# Patient Record
Sex: FEMALE | Race: WHITE | Employment: FULL TIME | URBAN - METROPOLITAN AREA
[De-identification: names, ages, dates, MRNs, and addresses within clinical notes are randomized per-mention and may not be internally consistent; named-entity substitution may affect disease eponyms.]

---

## 2019-05-03 PROBLEM — R31.0 GROSS HEMATURIA: Status: ACTIVE | Noted: 2019-05-03

## 2020-08-13 ENCOUNTER — HOSPITAL ENCOUNTER (OUTPATIENT)
Dept: CT IMAGING | Age: 50
Discharge: HOME | End: 2020-08-13
Attending: OBSTETRICS & GYNECOLOGY
Payer: SELF-PAY

## 2020-08-13 DIAGNOSIS — Z13.6 ENCOUNTER FOR SPECIAL SCREENING EXAMINATION FOR CARDIOVASCULAR DISORDER: ICD-10-CM

## 2020-08-13 PROCEDURE — 75571 CT HRT W/O DYE W/CA TEST: CPT

## 2020-08-19 ENCOUNTER — TELEPHONE (OUTPATIENT)
Dept: OTHER | Age: 50
End: 2020-08-19

## 2020-08-27 ENCOUNTER — TELEPHONE (OUTPATIENT)
Dept: OTHER | Age: 50
End: 2020-08-27

## 2020-08-27 NOTE — TELEPHONE ENCOUNTER
Patient identity verified and matched to demographic data. Patient notified of Coronary Calcium Score of 0. Patient education provided to maintain healthy lifestyle, healthy diet, and regular exercise. Follow up recommended with Primary Care MD prn. Patient verbalized understanding of results, patient education, and follow up care.

## 2021-02-26 ENCOUNTER — OFFICE VISIT (OUTPATIENT)
Dept: ORTHOPEDIC SURGERY | Age: 51
End: 2021-02-26
Payer: COMMERCIAL

## 2021-02-26 VITALS
BODY MASS INDEX: 26.99 KG/M2 | HEIGHT: 65 IN | RESPIRATION RATE: 18 BRPM | SYSTOLIC BLOOD PRESSURE: 103 MMHG | HEART RATE: 75 BPM | OXYGEN SATURATION: 99 % | TEMPERATURE: 97.1 F | DIASTOLIC BLOOD PRESSURE: 60 MMHG | WEIGHT: 162 LBS

## 2021-02-26 DIAGNOSIS — M67.442 DIGITAL MUCOUS CYST OF LEFT HAND: Primary | ICD-10-CM

## 2021-02-26 DIAGNOSIS — M15.1 DEGENERATIVE ARTHRITIS OF DISTAL INTERPHALANGEAL JOINT OF MIDDLE FINGER OF LEFT HAND: ICD-10-CM

## 2021-02-26 DIAGNOSIS — R22.32 FINGER MASS, LEFT: ICD-10-CM

## 2021-02-26 PROCEDURE — 73140 X-RAY EXAM OF FINGER(S): CPT | Performed by: ORTHOPAEDIC SURGERY

## 2021-02-26 PROCEDURE — 99204 OFFICE O/P NEW MOD 45 MIN: CPT | Performed by: ORTHOPAEDIC SURGERY

## 2021-02-26 NOTE — PROGRESS NOTES
Hector Elizondo is a 45 y.o. female right handed unspecified employment. Worker's Compensation and legal considerations: none filed. Vitals:    02/26/21 0855   BP: 103/60   Pulse: 75   Resp: 18   Temp: 97.1 °F (36.2 °C)   SpO2: 99%   Weight: 162 lb (73.5 kg)   Height: 5' 5\" (1.651 m)   PainSc:   1           Chief Complaint   Patient presents with    Finger Pain     left MF         HPI: Patient presents today with complaints of a mass over the back of her left middle finger near the tip. She reports pain about the mass whenever she bumps it. Date of onset: 2020    Injury: No    Prior Treatment:  No    Numbness/ Tingling: No      ROS: Review of Systems - General ROS: negative  Psychological ROS: negative  ENT ROS: negative  Allergy and Immunology ROS: negative  Hematological and Lymphatic ROS: negative  Respiratory ROS: no cough, shortness of breath, or wheezing  Cardiovascular ROS: no chest pain or dyspnea on exertion  Gastrointestinal ROS: no abdominal pain, change in bowel habits, or black or bloody stools  Musculoskeletal ROS: positive for - pain in finger - left  Neurological ROS: negative  Dermatological ROS: negative    Past Medical History:   Diagnosis Date    Calculus of kidney     Calculus of ureter     [facility] [address]     Hypercalciuria     Hyperuricosuria     Left flank pain     Meningioma ([facility] [address] 2016    s/p resection 8/2/2016 dr Lisset Perez Urinary frequency        Past Surgical History:   Procedure Laterality Date    HX MOHS PROCEDURES  10/7/15       Current Outpatient Medications   Medication Sig Dispense Refill    multivitamin (THERAPEUTIC LIQUID) liquid Take 10 mL by mouth.  tamsulosin (FLOMAX) 0.4 mg capsule Take 1 Cap by mouth daily (after dinner). Indications: kidney stone 20 Cap 0    lancets (ONETOUCH DELICA LANCETS) 30 gauge misc 1 Device.  glucose blood VI test strips (ONETOUCH VERIO) strip 50 Each.  tangerine flavor, bulk, powd by Does Not Apply route.       iodine 150 mcg tab Take  by mouth.  selenium (SELEPEN) 50 mcg tab Take 100 mcg by mouth daily.  tamsulosin (FLOMAX) 0.4 mg capsule Take 1 Cap by mouth daily. 30 Cap 11       Allergies   Allergen Reactions    [place] Anaphylaxis    Prednisone Other (comments)     Altered mental status           PE:     Physical Exam  Vitals signs and nursing note reviewed. Constitutional:       General: She is not in acute distress. Appearance: Normal appearance. She is not ill-appearing, toxic-appearing or diaphoretic. HENT:      Head: Normocephalic and atraumatic. Nose: Nose normal.      Mouth/Throat:      Mouth: Mucous membranes are moist.   Eyes:      Extraocular Movements: Extraocular movements intact. Pupils: Pupils are equal, round, and reactive to light. Neck:      Musculoskeletal: Normal range of motion and neck supple. Cardiovascular:      Pulses: Normal pulses. Pulmonary:      Effort: Pulmonary effort is normal. No respiratory distress. Abdominal:      General: Abdomen is flat. There is no distension. Musculoskeletal: Normal range of motion. General: Swelling and tenderness present. No deformity or signs of injury. Right lower leg: No edema. Left lower leg: No edema. Skin:     General: Skin is warm and dry. Capillary Refill: Capillary refill takes less than 2 seconds. Findings: No bruising or erythema. Neurological:      General: No focal deficit present. Mental Status: She is alert and oriented to person, place, and time. Cranial Nerves: No cranial nerve deficit. Sensory: No sensory deficit. Psychiatric:         Mood and Affect: Mood normal.         Behavior: Behavior normal.            Left hand: There is a palpable mass dorsal to the DIP joint of the middle finger. This is consistent with a digital mucous cyst.  This is mildly tender to palpation. Range of motion is full. Sensation is intact distally.       Imagin2021 2 views of left middle finger does not show any fracture, dislocation, degenerative changes, or any other osseous abnormalities. ICD-10-CM ICD-9-CM    1. Digital mucous cyst of left hand  M67.442 727.43 SCHEDULE SURGERY   2. 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 Finger mass, left  R22.32 782.2 AMB POC XRAY, FINGER(S), 2+ VIEWS         Plan:     Discussed operative and nonoperative treatment patient would like to have the cyst removed. Schedule left middle finger digital mucous cyst excision and possible rotational flap.    45 minutes was spent discussing operative, nonoperative treatment past medical history, and postoperative convalescence as well as consenting the patient. This procedure has been fully reviewed with the patient and written informed consent has been obtained. The patient was counseled at length about the risks of aracelis Covid-19 during their perioperative period and any recovery window from their procedure. The patient was made aware that aracelis Covid-19  may worsen their prognosis for recovering from their procedure and lend to a higher morbidity and/or mortality risk. All material risks, benefits, and reasonable alternatives including postponing the procedure were discussed. The patient does  wish to proceed with the procedure at this time. Follow-up and Dispositions    · Return for 2 weeks postop.           Plan was reviewed with patient, who verbalized agreement and understanding of the plan

## 2021-03-01 DIAGNOSIS — Z01.818 PREOP EXAMINATION: Primary | ICD-10-CM

## 2021-03-16 ENCOUNTER — OFFICE VISIT (OUTPATIENT)
Dept: ORTHOPEDIC SURGERY | Age: 51
End: 2021-03-16
Payer: COMMERCIAL

## 2021-03-16 VITALS
OXYGEN SATURATION: 98 % | TEMPERATURE: 97.3 F | RESPIRATION RATE: 18 BRPM | HEART RATE: 80 BPM | BODY MASS INDEX: 26.82 KG/M2 | WEIGHT: 161 LBS | DIASTOLIC BLOOD PRESSURE: 53 MMHG | SYSTOLIC BLOOD PRESSURE: 101 MMHG | HEIGHT: 65 IN

## 2021-03-16 DIAGNOSIS — M25.512 ACUTE PAIN OF LEFT SHOULDER: ICD-10-CM

## 2021-03-16 DIAGNOSIS — M75.02 ADHESIVE CAPSULITIS OF LEFT SHOULDER: Primary | ICD-10-CM

## 2021-03-16 PROCEDURE — 99203 OFFICE O/P NEW LOW 30 MIN: CPT | Performed by: ORTHOPAEDIC SURGERY

## 2021-03-16 PROCEDURE — 73030 X-RAY EXAM OF SHOULDER: CPT | Performed by: ORTHOPAEDIC SURGERY

## 2021-03-16 RX ORDER — MELOXICAM 15 MG/1
15 TABLET ORAL
Qty: 30 TAB | Refills: 1 | Status: CANCELLED | OUTPATIENT
Start: 2021-03-16

## 2021-03-16 RX ORDER — CELECOXIB 200 MG/1
200 CAPSULE ORAL 2 TIMES DAILY
Qty: 60 CAP | Refills: 0 | Status: SHIPPED | OUTPATIENT
Start: 2021-03-16

## 2021-03-16 NOTE — PROGRESS NOTES
Paluie Herrera  1970   Chief Complaint   Patient presents with    Shoulder Pain     left shoulder pain        HISTORY OF PRESENT ILLNESS  Paulie Herrera is a 45 y.o. female who presents today for evaluation of left shoulder pain. She rates her pain 7/10 today. Pain has been present for 1.5 months. No specific injury. Has hx of right frozen shoulder. Tried PT for 6 months and had two shoulder manipulations of her right shoulder. Describes a burning pain in the left shoulder. Has some limited ROM. Patient describes the pain as burning, aching and sharp that is Intermittent in nature. Symptoms are worse with moving arm behind back, Activity and is better with  Heat. Associated symptoms include nothing. Since problem started, it: is unchanged. Pain does not wake patient up at night. Has taken no meds for the problem. Has tried following treatments: Injections:NO; Brace:NO;  Therapy:NO; Cane/Crutch:NO       Allergies   Allergen Reactions    [place] Anaphylaxis    Prednisone Other (comments)     Altered mental status        Past Medical History:   Diagnosis Date    Calculus of kidney     Calculus of ureter     Diabetes ([facility] [address]     Hypercalciuria     Hyperuricosuria     Left flank pain     Meningioma ([facility] [address] 2016    s/p resection 8/2/2016 dr Jeromy Phelps   [facility] Urinary frequency       Social History     Socioeconomic History    Marital status:      Spouse name: Not on file    Number of children: Not on file    Years of education: Not on file    Highest education level: Not on file   Occupational History    Not on file   Social Needs    Financial resource strain: Not on file    Food insecurity     Worry: Not on file     Inability: Not on file   [employer] needs     Medical: Not on file     Non-medical: Not on file   Tobacco Use    Smoking status: Never Smoker    Smokeless tobacco: Never Used   Substance and Sexual Activity    Alcohol use: No    Drug use: No    Sexual activity: Yes Partners: Male     Birth control/protection: None   Lifestyle    Physical activity     Days per week: Not on file     Minutes per session: Not on file    Stress: Not on file   Relationships    Social connections     Talks on phone: Not on file     Gets together: Not on file     Attends Jehovah's witness service: Not on file     Active member of club or organization: Not on file     Attends meetings of clubs or organizations: Not on file     Relationship status: Not on file    Intimate partner violence     Fear of current or ex partner: Not on file     Emotionally abused: Not on file     Physically abused: Not on file     Forced sexual activity: Not on file   Other Topics Concern    Not on file   Social History Narrative    Not on file      Past Surgical History:   Procedure Laterality Date    HX MOHS PROCEDURES  10/7/15      Family History   Problem Relation Age of Onset    MS Mother     High Cholesterol Father     Elevated Lipids Father     Diabetes Maternal Grandmother     Cancer Maternal Grandmother     Drug Abuse Son     Depression Son     Substance Abuse Son       Current Outpatient Medications   Medication Sig    multivitamin (THERAPEUTIC LIQUID) liquid Take 10 mL by mouth.  lancets (ONETOUCH DELICA LANCETS) 30 gauge misc 1 Device.  glucose blood VI test strips (ONETOUCH VERIO) strip 50 Each.  tamsulosin (FLOMAX) 0.4 mg capsule Take 1 Cap by mouth daily (after dinner). Indications: kidney stone    tangerine flavor, bulk, powd by Does Not Apply route.  iodine 150 mcg tab Take  by mouth.  selenium (SELEPEN) 50 mcg tab Take 100 mcg by mouth daily.  tamsulosin (FLOMAX) 0.4 mg capsule Take 1 Cap by mouth daily. No current facility-administered medications for this visit. REVIEW OF SYSTEM   Patient denies: Weight loss, Fever/Chills, HA, Visual changes, Fatigue, Chest pain, SOB, Abdominal pain, N/V/D/C, Blood in stool or urine, Edema. Pertinent positive as above in HPI.  All others were negative    PHYSICAL EXAM:   Visit Vitals  BP (!) 101/53 (BP 1 Location: Right upper arm, BP Patient Position: Sitting, BP Cuff Size: Large adult)   Pulse 80   Temp 97.3 °F (36.3 °C) (Temporal)   Resp 18   Ht 5' 5\" (1.651 m)   Wt 161 lb (73 kg)   SpO2 98%   BMI 26.79 kg/m²     The patient is a well-developed, well-nourished female   in no acute distress. The patient is alert and oriented times three. The patient is alert and oriented times three. Mood and affect are normal.  LYMPHATIC: lymph nodes are not enlarged and are within normal limits  SKIN: normal in color and non tender to palpation. There are no bruises or abrasions noted. NEUROLOGICAL: Motor sensory exam is within normal limits. Reflexes are equal bilaterally. There is normal sensation to pinprick and light touch  MUSCULOSKELETAL:  Examination Left shoulder   Skin Intact   AC joint tenderness -   Biceps tenderness -   Forward flexion/Elevation    Active abduction    Glenohumeral abduction 60   External rotation ROM 30   Internal rotation ROM 30   Apprehension -   Tacos Relocation -   Jerk -   Load and Shift -   Obriens -   Speeds -   Impingement sign +   Supraspinatus/Empty Can -, 5/5   External Rotation Strength -, 5/5   Lift Off/Belly Press -, 5/5   Neurovascular Intact       IMAGING: XR of left shoulder with 3 views obtained in the office dated 3/16/2021 was reviewed and read by Dr. Gutierrez Shall: No acute abnormalities       IMPRESSION:      ICD-10-CM ICD-9-CM    1. Adhesive capsulitis of left shoulder  M75.02 726.0 REFERRAL TO PHYSICAL THERAPY      celecoxib (CeleBREX) 200 mg capsule   2. Acute pain of left shoulder  M25.512 719.41 AMB POC XRAY, SHOULDER; COMPLETE, 2+        PLAN:  1. Pt presents today with left shoulder pain due to adhesive capsulitis and I would like for her to begin PT. Was also given prescription for Celebrex today. Risk factors include: dm  2. No ultrasound exam indicated today  3.  No cortisone injection indicated today   4. Yes Physical/Occupational Therapy indicated today  5. No diagnostic test indicated today:   6. No durable medical equipment indicated today  7. No referral indicated today   8. Yes medications indicated today: CELEBREX  9. No Narcotic indicated today       RTC 4 weeks      Scribed by Donna Mckeon) as dictated by Jennifer Hui MD    I, Dr. Jennifer Hui, confirm that all documentation is accurate.     Jennifer Hui M.D.   [facility]

## 2021-03-18 ENCOUNTER — HOSPITAL ENCOUNTER (OUTPATIENT)
Dept: PHYSICAL THERAPY | Age: 51
Discharge: HOME | End: 2021-03-18
Payer: COMMERCIAL

## 2021-03-18 PROCEDURE — 97161 PT EVAL LOW COMPLEX 20 MIN: CPT

## 2021-03-18 PROCEDURE — 97110 THERAPEUTIC EXERCISES: CPT

## 2021-03-18 NOTE — PROGRESS NOTES
[facility]  [address]  [place], [address]  [phone] fax    Plan of Care/ Statement of Necessity for Physical Therapy Services    Patient name: Tg Toro Start of Care: 3/18/2021   Referral source: Rex Childers,* : 1970    Medical Diagnosis: Left shoulder pain [M25.512]  Payor: PayLease / Plan: [place] [employer] / Product Type: PPO /  Onset Date:2021    Treatment Diagnosis: left shoulder pain   Prior Hospitalization: see medical history Provider#: [id number]   Medications: Verified on Patient summary List    Comorbidities: diabetes, right frozen shoulder, brain surgery   Prior Level of Function: functionally I with all activities     The Plan of Care and following information is based on the information from the initial evaluation. Assessment/ key information: 46 y/o female presents with c/o left shoulder pain that started last month with no mechanism of injury. She has seen ortho and diagnosed with left adhesive capsulitis. The pt demonstrates limited left shoulder AROM flexion and abduction with pain. AROM IR and ER are WFL at this time and equal to the right. + pain is note with AROM IR. MMT reveals weakness with left shoulder flexion, abduction. PROM is limited with flexion and abduction and with pain. The pt currently is requesting limited PT sessions and wishes to perform HEP primarily at this time. She does also plan to seek chiropractic care and acupuncture. She will benefit from PT to address the aforementioned impairments.      Evaluation Complexity History LOW Complexity : Zero comorbidities / personal factors that will impact the outcome / POC; Examination MEDIUM Complexity : 3 Standardized tests and measures addressing body structure, function, activity limitation and / or participation in recreation  ;Presentation LOW Complexity : Stable, uncomplicated  ;Clinical Decision Making MEDIUM Complexity : FOTO score of 26-74  Overall Complexity Rating: LOW   Problem List: pain affecting function, decrease ROM, decrease strength, decrease ADL/ functional abilitiies, decrease activity tolerance and decrease flexibility/ joint mobility   Treatment Plan may include any combination of the following: Therapeutic exercise, Therapeutic activities, Neuromuscular re-education, Physical agent/modality, Manual therapy, Patient education and Self Care training  Patient / Family readiness to learn indicated by: asking questions, trying to perform skills and interest  Persons(s) to be included in education: patient (P)  Barriers to Learning/Limitations: None  Patient Goal (s): To learn exercises for home  Patient Self Reported Health Status: excellent  Rehabilitation Potential: good    Short Term Goals: To be accomplished in 1 weeks:   1. Pt pt will be I and compliant with [facility]   IE- issued HEP  Long Term Goals: To be accomplished in 3-4 weeks:   1. Improve FOTO to 74 for improved ability for daily tasks   IE- 61   2. Improve left shoulder AROM flexion to 155 degrees to improve ability for daily tasks   IE- 142 degrees   3. Improve left shoulder AROM ABD to 140 degrees for improved ability for ADL   IE- 128 degrees   4. The pt will report no difficulty with reaching a overhead shelf   IE- some difficulty  Frequency / Duration: Patient to be seen 1-2 times per week for 3-4 weeks. Patient/ Caregiver education and instruction: Diagnosis, prognosis, self care and exercises   [x]  Plan of care has been reviewed with RAMBO Shelby, PT 3/18/2021 10:06 AM    ________________________________________________________________________    I certify that the above Therapy Services are being furnished while the patient is under my care. I agree with the treatment plan and certify that this therapy is necessary.     Physician's Signature:____________Date:_________TIME:________     Halie Fraga,*  ** Signature, Date and Time must be completed for valid certification **    Please sign and return to [facility] [address]  [address]  [place], [address]  [phone] fax

## 2021-03-18 NOTE — PROGRESS NOTES
PT DAILY TREATMENT NOTE     Patient Name: Neal Hernandez  Date:3/18/2021  : 1970  [x]  Patient  Verified  Payor: Amadou Richmond / Plan:  [facility] / Product Type: PPO /    In OREE:[id number]  Out time:957  Total Treatment Time (min): 42  Visit #: 1 of 3-4    Medicare/BCBS Only   Total Timed Codes (min):  24 1:1 Treatment Time:  42       Treatment Area: Left shoulder pain [M25.512]    SUBJECTIVE  Pain Level (0-10 scale):  2  Any medication changes, allergies to medications, adverse drug reactions, diagnosis change, or new procedure performed?: [x] No    [] Yes (see summary sheet for update)  Subjective functional status/changes:   [] No changes reported       OBJECTIVE    Modality rationale:    Min Type Additional Details    [] Estim:  []Unatt       []IFC  []Premod                        []Other:  []w/ice   []w/heat  Position:  Location:    [] Estim: []Att    []TENS instruct  []NMES                    []Other:  []w/US   []w/ice   []w/heat  Position:  Location:    []  Traction: [] Cervical       []Lumbar                       [] Prone          []Supine                       []Intermittent   []Continuous Lbs:  [] before manual  [] after manual    []  Ultrasound: []Continuous   [] Pulsed                           []1MHz   []3MHz W/cm2:  Location:    []  Iontophoresis with dexamethasone         Location: [] Take home patch   [] In clinic    []  Ice     []  heat  []  Ice massage  []  Laser   []  Anodyne Position:  Location:    []  Laser with stim  []  Other:  Position:  Location:    []  Vasopneumatic Device Pressure:       [] lo [] med [] hi   Temperature: [] lo [] med [] hi   [] Skin assessment post-treatment:  []intact []redness- no adverse reaction    []redness  adverse reaction:     18 min [x]Eval                  []Re-Eval       24 min Therapeutic Exercise:  [] See flow sheet : HEP + PROM left shoulder    Rationale: increase ROM and increase strength to improve the patients ability to perform ADL            With   [] TE   [] TA   [] neuro   [] other: Patient Education: [x] Review HEP    [] Progressed/Changed HEP based on:   [] positioning   [] body mechanics   [] transfers   [] heat/ice application    [] other:      Other Objective/Functional Measures:       Pain Level (0-10 scale) post treatment: 2    ASSESSMENT/Changes in Function:      Patient will continue to benefit from skilled PT services to modify and progress therapeutic interventions, address functional mobility deficits, address ROM deficits, address strength deficits, analyze and address soft tissue restrictions and analyze and cue movement patterns to attain remaining goals. [x]  See Plan of Care  []  See progress note/recertification  []  See Discharge Summary         Progress towards goals / Updated goals:  Short Term Goals: To be accomplished in 1 weeks:               1. Pt pt will be I and compliant with HEP               IE- issued HEP  Long Term Goals:  To be accomplished in 3-4 weeks:               1. Improve FOTO to 74 for improved ability for daily tasks               IE- 61               2. Improve left shoulder AROM flexion to 155 degrees to improve ability for daily tasks               IE- 142 degrees               3. Improve left shoulder AROM ABD to 140 degrees for improved ability for ADL               IE- 128 degrees               4. The pt will report no difficulty with reaching a overhead shelf               IE- some difficulty    PLAN  []  Upgrade activities as tolerated     [x]  Continue plan of care  []  Update interventions per flow sheet       []  Discharge due to:_  []  Other:_      Rosaura Mills, PT 3/18/2021  10:02 AM    Future Appointments   Date Time Provider Alondra Mayo   3/24/2021  5:30 PM Chestine Bathe, PT [facility]   3/25/2021  8:05 AM Roque Chapman Sa, DO [facility]   4/8/2021  7:00 AM Chestine Bathe, PT [facility]   4/12/2021  8:00 AM Roque Chapman Sa, DO [facility]   4/22/2021  7:00 AM Angel Wang, PT [facility]

## 2021-03-24 ENCOUNTER — HOSPITAL ENCOUNTER (OUTPATIENT)
Dept: PHYSICAL THERAPY | Age: 51
Discharge: HOME | End: 2021-03-24
Payer: COMMERCIAL

## 2021-03-24 PROCEDURE — 97140 MANUAL THERAPY 1/> REGIONS: CPT

## 2021-03-24 PROCEDURE — 97016 VASOPNEUMATIC DEVICE THERAPY: CPT

## 2021-03-24 PROCEDURE — 97110 THERAPEUTIC EXERCISES: CPT

## 2021-03-24 NOTE — PROGRESS NOTES
PT DAILY TREATMENT NOTE     Patient Name: Mynor Close  Date:3/24/2021  : 1970  [x]  Patient  Verified  Payor: BLUE CROSS / Plan:  [facility] / Product Type: PPO /    In time:5:10  Out time:6:07  Total Treatment Time (min): 62  Visit #: 2 of 4    Medicare/BCBS Only   Total Timed Codes (min):  47 1:1 Treatment Time:  52       Treatment Area: Left shoulder pain [M25.512]    SUBJECTIVE  Pain Level (0-10 scale): 1-2  Any medication changes, allergies to medications, adverse drug reactions, diagnosis change, or new procedure performed?: [x] No    [] Yes (see summary sheet for update)  Subjective functional status/changes:   [] No changes reported  The pt reports HEP compliance and feels she is doing better.      OBJECTIVE    Modality rationale: decrease inflammation and decrease pain to improve the patients ability to perform all activiites   Min Type Additional Details    [] Estim:  []Unatt       []IFC  []Premod                        []Other:  []w/ice   []w/heat  Position:  Location:    [] Estim: []Att    []TENS instruct  []NMES                    []Other:  []w/US   []w/ice   []w/heat  Position:  Location:    []  Traction: [] Cervical       []Lumbar                       [] Prone          []Supine                       []Intermittent   []Continuous Lbs:  [] before manual  [] after manual    []  Ultrasound: []Continuous   [] Pulsed                           []1MHz   []3MHz W/cm2:  Location:    []  Iontophoresis with dexamethasone         Location: [] Take home patch   [] In clinic    []  Ice     []  heat  []  Ice massage  []  Laser   []  Anodyne Position:  Location:    []  Laser with stim  []  Other:  Position:  Location:   10 [x]  Vasopneumatic Device Pressure:       [x] lo [] med [] hi   Temperature: [x] lo [] med [] hi   [] Skin assessment post-treatment:  []intact []redness- no adverse reaction    []redness  adverse reaction:     37 min Therapeutic Exercise:  [x] See flow sheet : Rationale: increase ROM and increase strength to improve the patients ability to perform ADL         10 min Manual Therapy:  Pt right sidelying- STJ mobs, TPR supraspinatus and infraspinatus, pt supine-GHJ post and inferior grade II/III glides, PROM all planes    The manual therapy interventions were performed at a separate and distinct time from the therapeutic activities interventions. Rationale: decrease pain, increase ROM and increase tissue extensibility to improve functional mobility            With   [] TE   [] TA   [] neuro   [] other: Patient Education: [x] Review HEP    [] Progressed/Changed HEP based on:   [] positioning   [] body mechanics   [] transfers   [] heat/ice application    [] other:      Other Objective/Functional Measures: initiated treatment per flow sheet     Pain Level (0-10 scale) post treatment: 0    ASSESSMENT/Changes in Function: The pt performed all therex without much difficulty. She did have some end range pain with abduction. Patient will continue to benefit from skilled PT services to modify and progress therapeutic interventions, address functional mobility deficits, address ROM deficits, address strength deficits, analyze and address soft tissue restrictions and analyze and cue movement patterns to attain remaining goals. []  See Plan of Care  []  See progress note/recertification  []  See Discharge Summary         Progress towards goals / Updated goals:  Short Term Goals: To be accomplished in 1 weeks:               1. Pt pt will be I and compliant with HEP               IE- issued HEP   Current: reports compliance on 3-24-21  Long Term Goals: To be accomplished in 3-4 weeks:               1. Improve FOTO to 74 for improved ability for daily tasks               IE- 61               2.  Improve left shoulder AROM flexion to 155 degrees to improve ability for daily tasks               IE- 142 degrees               3. Improve left shoulder AROM ABD to 140 degrees for improved ability for ADL               IE- 128 degrees               4.  The pt will report no difficulty with reaching a overhead shelf               IE- some difficulty    PLAN  []  Upgrade activities as tolerated     [x]  Continue plan of care  []  Update interventions per flow sheet       []  Discharge due to:_  []  Other:_      Luc Nelson, PT 3/24/2021  5:23 PM    Future Appointments   Date Time Provider Alondra Mayo   3/24/2021  5:30 PM Moses Martinez, PT [facility]   3/25/2021  8:05 AM Siddharth SHULTZ, DO [facility]   3/26/2021  7:30 AM [facility] PAT ROOM P1 [facility]   4/8/2021  7:00 AM Moses Martinez, PT [facility]   4/12/2021  8:00 AM Siddharth SHULTZ DO [facility]   4/22/2021  7:00 AM Cale Spain, PT [facility]

## 2021-03-25 ENCOUNTER — OFFICE VISIT (OUTPATIENT)
Dept: ORTHOPEDIC SURGERY | Age: 51
End: 2021-03-25

## 2021-03-25 VITALS
DIASTOLIC BLOOD PRESSURE: 68 MMHG | TEMPERATURE: 97.1 F | SYSTOLIC BLOOD PRESSURE: 109 MMHG | HEIGHT: 65 IN | BODY MASS INDEX: 26.89 KG/M2 | WEIGHT: 161.4 LBS | HEART RATE: 80 BPM | RESPIRATION RATE: 16 BRPM | OXYGEN SATURATION: 98 %

## 2021-03-25 DIAGNOSIS — M67.442 DIGITAL MUCOUS CYST OF LEFT HAND: Primary | ICD-10-CM

## 2021-03-25 DIAGNOSIS — M15.1 DEGENERATIVE ARTHRITIS OF DISTAL INTERPHALANGEAL JOINT OF MIDDLE FINGER OF LEFT HAND: ICD-10-CM

## 2021-03-25 DIAGNOSIS — Z01.818 PREOP EXAMINATION: ICD-10-CM

## 2021-03-25 RX ORDER — IBUPROFEN 200 MG
400 TABLET ORAL
COMMUNITY
End: 2021-03-30

## 2021-03-25 NOTE — PROGRESS NOTES
Jefferson Puentes is a 45 y.o. female right handed unspecified employment. Worker's Compensation and legal considerations: none filed. Vitals:    03/25/21 0806   BP: 109/68   Pulse: 80   Resp: 16   Temp: 97.1 °F (36.2 °C)   SpO2: 98%   Weight: 161 lb 6.4 oz (73.2 kg)   Height: 5' 5\" (1.651 m)   PainSc:   0 - No pain           Chief Complaint   Patient presents with    Pre-op Exam     left middle finger         HPI: Patient presents today with complaints of a mass over the back of her left middle finger near the tip. She reports pain about the mass whenever she bumps it. Date of onset: 2020    Injury: No    Prior Treatment:  No    Numbness/ Tingling: No      ROS: Review of Systems - General ROS: negative  Psychological ROS: negative  ENT ROS: negative  Allergy and Immunology ROS: negative  Hematological and Lymphatic ROS: negative  Respiratory ROS: no cough, shortness of breath, or wheezing  Cardiovascular ROS: no chest pain or dyspnea on exertion  Gastrointestinal ROS: no abdominal pain, change in bowel habits, or black or bloody stools  Musculoskeletal ROS: positive for - pain in finger - left  Neurological ROS: negative  Dermatological ROS: negative    Past Medical History:   Diagnosis Date    Calculus of kidney     Calculus of ureter     Diabetes ([address]     Hypercalciuria     Hyperuricosuria     Left flank pain     M[facility]i[address] ([facility] [address] 2016    s/p resection 8/2/2016 dr Simone Tracy   [facility] Urinary frequency        Past Surgical History:   Procedure Laterality Date    HX MOHS PROCEDURES  10/7/15       Current Outpatient Medications   Medication Sig Dispense Refill    lancets (ONETOUCH DELICA LANCETS) 30 gauge misc 1 Device.  glucose blood VI test strips (ONETOUCH VERIO) strip 50 Each.  celecoxib (CeleBREX) 200 mg capsule Take 1 Cap by mouth two (2) times a day. 200 MG BID X 4 days, then once per day after 4 days. 60 Cap 0    tamsulosin (FLOMAX) 0.4 mg capsule Take 1 Cap by mouth daily (after dinner). Indications: kidney stone 20 Cap 0    multivitamin (THERAPEUTIC LIQUID) liquid Take 10 mL by mouth.  tangerine flavor, bulk, powd by Does Not Apply route.  iodine 150 mcg tab Take  by mouth.  selenium (SELEPEN) 50 mcg tab Take 100 mcg by mouth daily.  tamsulosin (FLOMAX) 0.4 mg capsule Take 1 Cap by mouth daily. 30 Cap 11       Allergies   Allergen Reactions    [place] Anaphylaxis    Prednisone Other (comments)     Altered mental status           PE:     Physical Exam  Vitals signs and nursing note reviewed. Constitutional:       General: She is not in acute distress. Appearance: Normal appearance. She is not ill-appearing, toxic-appearing or diaphoretic. HENT:      Head: Normocephalic and atraumatic. Nose: Nose normal.      Mouth/Throat:      Mouth: Mucous membranes are moist.   Eyes:      Extraocular Movements: Extraocular movements intact. Pupils: Pupils are equal, round, and reactive to light. Neck:      Musculoskeletal: Normal range of motion and neck supple. Cardiovascular:      Pulses: Normal pulses. Pulmonary:      Effort: Pulmonary effort is normal. No respiratory distress. Abdominal:      General: Abdomen is flat. There is no distension. Musculoskeletal: Normal range of motion. General: Swelling and tenderness present. No deformity or signs of injury. Right lower leg: No edema. Left lower leg: No edema. Skin:     General: Skin is warm and dry. Capillary Refill: Capillary refill takes less than 2 seconds. Findings: No bruising or erythema. Neurological:      General: No focal deficit present. Mental Status: She is alert and oriented to person, place, and time. Cranial Nerves: No cranial nerve deficit. Sensory: No sensory deficit. Psychiatric:         Mood and Affect: Mood normal.         Behavior: Behavior normal.            Left hand: There is a palpable mass dorsal to the DIP joint of the middle finger. This is consistent with a digital mucous cyst.  This is mildly tender to palpation. Range of motion is full. Sensation is intact distally. Imagin2021 2 views of left middle finger does not show any fracture, dislocation, degenerative changes, or any other osseous abnormalities. ICD-10-CM ICD-9-CM    1. Digital mucous cyst of left hand  M67.442 727.43    2. 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 This procedure has been fully reviewed with the patient and written informed consent has been obtained. The patient was counseled at length about the risks of aracelis Covid-19 during their perioperative period and any recovery window from their procedure. The patient was made aware that aracelis Covid-19  may worsen their prognosis for recovering from their procedure and lend to a higher morbidity and/or mortality risk. All material risks, benefits, and reasonable alternatives including postponing the procedure were discussed. The patient does  wish to proceed with the procedure at this time. Follow-up and Dispositions    · Return for as scheduled.           Plan was reviewed with patient, who verbalized agreement and understanding of the plan

## 2021-03-26 ENCOUNTER — HOSPITAL ENCOUNTER (OUTPATIENT)
Dept: PREADMISSION TESTING | Age: 51
Discharge: HOME | End: 2021-03-26
Payer: COMMERCIAL

## 2021-03-26 DIAGNOSIS — Z01.818 PREOP EXAMINATION: ICD-10-CM

## 2021-03-26 LAB
ALBUMIN SERPL-MCNC: 4.1 G/DL (ref 3.4–5)
ALBUMIN/GLOB SERPL: 1.1 {RATIO} (ref 0.8–1.7)
ALP SERPL-CCNC: 106 U/L (ref 45–117)
ALT SERPL-CCNC: 26 U/L (ref 13–56)
ANION GAP SERPL CALC-SCNC: 3 MMOL/L (ref 3–18)
AST SERPL-CCNC: 16 U/L (ref 10–38)
BASOPHILS # BLD: 0 K/UL (ref 0–0.1)
BASOPHILS NFR BLD: 0 % (ref 0–2)
BILIRUB SERPL-MCNC: 0.6 MG/DL (ref 0.2–1)
BUN SERPL-MCNC: 15 MG/DL (ref 7–18)
BUN/CREAT SERPL: 34 (ref 12–20)
CALCIUM SERPL-MCNC: 9.9 MG/DL (ref 8.5–10.1)
CHLORIDE SERPL-SCNC: 105 MMOL/L (ref 100–111)
CO2 SERPL-SCNC: 31 MMOL/L (ref 21–32)
CREAT SERPL-MCNC: 0.44 MG/DL (ref 0.6–1.3)
DIFFERENTIAL METHOD BLD: NORMAL
EOSINOPHIL # BLD: 0.2 K/UL (ref 0–0.4)
EOSINOPHIL NFR BLD: 2 % (ref 0–5)
ERYTHROCYTE [DISTWIDTH] IN BLOOD BY AUTOMATED COUNT: 13.7 % (ref 11.6–14.5)
GLOBULIN SER CALC-MCNC: 3.6 G/DL (ref 2–4)
GLUCOSE SERPL-MCNC: 118 MG/DL (ref 74–99)
HCT VFR BLD AUTO: 41.7 % (ref 35–45)
HGB BLD-MCNC: 13.9 G/DL (ref 12–16)
LYMPHOCYTES # BLD: 1.8 K/UL (ref 0.9–3.6)
LYMPHOCYTES NFR BLD: 29 % (ref 21–52)
MCH RBC QN AUTO: 29.3 PG (ref 24–34)
MCHC RBC AUTO-ENTMCNC: 33.3 G/DL (ref 31–37)
MCV RBC AUTO: 87.8 FL (ref 74–97)
MONOCYTES # BLD: 0.4 K/UL (ref 0.05–1.2)
MONOCYTES NFR BLD: 7 % (ref 3–10)
NEUTS SEG # BLD: 4 K/UL (ref 1.8–8)
NEUTS SEG NFR BLD: 62 % (ref 40–73)
PLATELET # BLD AUTO: 265 K/UL (ref 135–420)
PMV BLD AUTO: 10.6 FL (ref 9.2–11.8)
POTASSIUM SERPL-SCNC: 4.4 MMOL/L (ref 3.5–5.5)
PROT SERPL-MCNC: 7.7 G/DL (ref 6.4–8.2)
RBC # BLD AUTO: 4.75 M/UL (ref 4.2–5.3)
SODIUM SERPL-SCNC: 139 MMOL/L (ref 136–145)
WBC # BLD AUTO: 6.4 K/UL (ref 4.6–13.2)

## 2021-03-26 PROCEDURE — 85025 COMPLETE CBC W/AUTO DIFF WBC: CPT

## 2021-03-26 PROCEDURE — 36415 COLL VENOUS BLD VENIPUNCTURE: CPT

## 2021-03-26 PROCEDURE — U0003 INFECTIOUS AGENT DETECTION BY NUCLEIC ACID (DNA OR RNA); SEVERE ACUTE RESPIRATORY SYNDROME CORONAVIRUS 2 (SARS-COV-2) (CORONAVIRUS DISEASE [COVID-19]), AMPLIFIED PROBE TECHNIQUE, MAKING USE OF HIGH THROUGHPUT TECHNOLOGIES AS DESCRIBED BY CMS-2020-01-R: HCPCS

## 2021-03-26 PROCEDURE — 93005 ELECTROCARDIOGRAM TRACING: CPT

## 2021-03-26 PROCEDURE — 80053 COMPREHEN METABOLIC PANEL: CPT

## 2021-03-27 LAB
ATRIAL RATE: 66 BPM
CALCULATED P AXIS, ECG09: 61 DEGREES
CALCULATED R AXIS, ECG10: 44 DEGREES
CALCULATED T AXIS, ECG11: 65 DEGREES
DIAGNOSIS, 93000: NORMAL
P-R INTERVAL, ECG05: 152 MS
Q-T INTERVAL, ECG07: 386 MS
QRS DURATION, ECG06: 80 MS
QTC CALCULATION (BEZET), ECG08: 404 MS
SARS-COV-2, COV2NT: NOT DETECTED
VENTRICULAR RATE, ECG03: 66 BPM

## 2021-03-29 ENCOUNTER — ANESTHESIA EVENT (OUTPATIENT)
Dept: SURGERY | Age: 51
End: 2021-03-29
Payer: COMMERCIAL

## 2021-03-29 NOTE — H&P
Erik Malagon is a 45 y.o. female right handed unspecified employment. Worker's Compensation and legal considerations: none filed.         Vitals:     03/25/21 0806   BP: 109/68   Pulse: 80   Resp: 16   Temp: 97.1 °F (36.2 °C)   SpO2: 98%   Weight: 161 lb 6.4 oz (73.2 kg)   Height: 5' 5\" (1.651 m)   PainSc:   0 - No pain                    Chief Complaint   Patient presents with    Pre-op Exam       left middle finger            HPI: Patient presents today with complaints of a mass over the back of her left middle finger near the tip. She reports pain about the mass whenever she bumps it.     Date of onset: 2020     Injury: No     Prior Treatment:  No     Numbness/ Tingling: No        ROS: Review of Systems - General ROS: negative  Psychological ROS: negative  ENT ROS: negative  Allergy and Immunology ROS: negative  Hematological and Lymphatic ROS: negative  Respiratory ROS: no cough, shortness of breath, or wheezing  Cardiovascular ROS: no chest pain or dyspnea on exertion  Gastrointestinal ROS: no abdominal pain, change in bowel habits, or black or bloody stools  Musculoskeletal ROS: positive for - pain in finger - left  Neurological ROS: negative  Dermatological ROS: negative          Past Medical History:   Diagnosis Date    Calculus of kidney      Calculus of ureter      Diabetes (HCC)      Hypercalciuria      Hyperuricosuria      Left flank pain      Meningioma ([address] 2016     s/p resection 8/2/2016 dr Zuleika Higgins Urinary frequency           Surgical History         Past Surgical History:   Procedure Laterality Date    HX MOHS PROCEDURES   10/7/15                   Current Outpatient Medications   Medication Sig Dispense Refill    lancets (ONETOUCH DELICA LANCETS) 30 gauge misc 1 Device.        glucose blood VI test strips (ONETOUCH VERIO) strip 50 Each.        celecoxib (CeleBREX) 200 mg capsule Take 1 Cap by mouth two (2) times a day. 200 MG BID X 4 days, then once per day after 4 days.  61 Cap 0    tamsulosin (FLOMAX) 0.4 mg capsule Take 1 Cap by mouth daily (after dinner). Indications: kidney stone 20 Cap 0    multivitamin (THERAPEUTIC LIQUID) liquid Take 10 mL by mouth.        tangerine flavor, bulk, powd by Does Not Apply route.        iodine 150 mcg tab Take  by mouth.        selenium (SELEPEN) 50 mcg tab Take 100 mcg by mouth daily.        tamsulosin (FLOMAX) 0.4 mg capsule Take 1 Cap by mouth daily. 30 Cap 11               Allergies   Allergen Reactions    [place] Anaphylaxis    Prednisone Other (comments)       Altered mental status               PE:      Physical Exam  Vitals signs and nursing note reviewed. Constitutional:       General: She is not in acute distress. Appearance: Normal appearance. She is not ill-appearing, toxic-appearing or diaphoretic. HENT:      Head: Normocephalic and atraumatic. Nose: Nose normal.      Mouth/Throat:      Mouth: Mucous membranes are moist.   Eyes:      Extraocular Movements: Extraocular movements intact. Pupils: Pupils are equal, round, and reactive to light. Neck:      Musculoskeletal: Normal range of motion and neck supple. Cardiovascular:      Pulses: Normal pulses. Pulmonary:      Effort: Pulmonary effort is normal. No respiratory distress. Abdominal:      General: Abdomen is flat. There is no distension. Musculoskeletal: Normal range of motion. General: Swelling and tenderness present. No deformity or signs of injury. Right lower leg: No edema. Left lower leg: No edema. Skin:     General: Skin is warm and dry. Capillary Refill: Capillary refill takes less than 2 seconds. Findings: No bruising or erythema. Neurological:      General: No focal deficit present. Mental Status: She is alert and oriented to person, place, and time. Cranial Nerves: No cranial nerve deficit. Sensory: No sensory deficit.    Psychiatric:         Mood and Affect: Mood normal.         Behavior: Behavior normal.               Left hand: There is a palpable mass dorsal to the DIP joint of the middle finger. This is consistent with a digital mucous cyst.  This is mildly tender to palpation. Range of motion is full. Sensation is intact distally.        Imagin/26/2021 2 views of left middle finger does not show any fracture, dislocation, degenerative changes, or any other osseous abnormalities.           ICD-10-CM ICD-9-CM     1. Digital mucous cyst of left hand  M67.442 727.43     2. 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     This procedure has been fully reviewed with the patient and written informed consent has been obtained.     The patient was counseled at length about the risks of aracelis Covid-19 during their perioperative period and any recovery window from their procedure.  The patient was made aware that aracelis Covid-19  may worsen their prognosis for recovering from their procedure and lend to a higher morbidity and/or mortality risk.  All material risks, benefits, and reasonable alternatives including postponing the procedure were discussed.  The patient does  wish to proceed with the procedure at this time.           Follow-up and Dispositions    · Return for as scheduled.            Plan was reviewed with patient, who verbalized agreement and understanding of the plan

## 2021-03-30 ENCOUNTER — ANESTHESIA (OUTPATIENT)
Dept: SURGERY | Age: 51
End: 2021-03-30
Payer: COMMERCIAL

## 2021-03-30 ENCOUNTER — HOSPITAL ENCOUNTER (OUTPATIENT)
Age: 51
Setting detail: OUTPATIENT SURGERY
Discharge: HOME | End: 2021-03-30
Attending: ORTHOPAEDIC SURGERY | Admitting: ORTHOPAEDIC SURGERY
Payer: COMMERCIAL

## 2021-03-30 VITALS
DIASTOLIC BLOOD PRESSURE: 62 MMHG | WEIGHT: 163 LBS | OXYGEN SATURATION: 98 % | TEMPERATURE: 96.8 F | BODY MASS INDEX: 27.16 KG/M2 | HEART RATE: 60 BPM | SYSTOLIC BLOOD PRESSURE: 89 MMHG | RESPIRATION RATE: 20 BRPM | HEIGHT: 65 IN

## 2021-03-30 PROBLEM — M67.442 DIGITAL MUCOUS CYST OF FINGER OF LEFT HAND: Status: ACTIVE | Noted: 2021-03-30

## 2021-03-30 PROBLEM — M15.1 DEGENERATIVE ARTHRITIS OF DISTAL INTERPHALANGEAL JOINT OF MIDDLE FINGER OF LEFT HAND: Status: ACTIVE | Noted: 2021-03-30

## 2021-03-30 LAB
GLUCOSE BLD STRIP.AUTO-MCNC: 118 MG/DL (ref 70–110)
GLUCOSE BLD STRIP.AUTO-MCNC: 118 MG/DL (ref 70–110)
HCG UR QL: NEGATIVE

## 2021-03-30 PROCEDURE — 77030013079 HC BLNKT BAIR HGGR 3M -A: Performed by: ANESTHESIOLOGY

## 2021-03-30 PROCEDURE — 74011250637 HC RX REV CODE- 250/637: Performed by: NURSE ANESTHETIST, CERTIFIED REGISTERED

## 2021-03-30 PROCEDURE — 77030002888 HC SUT CHRMC J&J -A: Performed by: ORTHOPAEDIC SURGERY

## 2021-03-30 PROCEDURE — 77030006689 HC BLD OPHTH BVR BD -A: Performed by: ORTHOPAEDIC SURGERY

## 2021-03-30 PROCEDURE — 76060000032 HC ANESTHESIA 0.5 TO 1 HR: Performed by: ORTHOPAEDIC SURGERY

## 2021-03-30 PROCEDURE — 77030000032 HC CUF TRNQT ZIMM -B: Performed by: ORTHOPAEDIC SURGERY

## 2021-03-30 PROCEDURE — 77030040361 HC SLV COMPR DVT MDII -B: Performed by: ORTHOPAEDIC SURGERY

## 2021-03-30 PROCEDURE — 81025 URINE PREGNANCY TEST: CPT

## 2021-03-30 PROCEDURE — 88304 TISSUE EXAM BY PATHOLOGIST: CPT

## 2021-03-30 PROCEDURE — 26160 REMOVE TENDON SHEATH LESION: CPT | Performed by: ORTHOPAEDIC SURGERY

## 2021-03-30 PROCEDURE — 74011000250 HC RX REV CODE- 250: Performed by: ORTHOPAEDIC SURGERY

## 2021-03-30 PROCEDURE — 74011000250 HC RX REV CODE- 250: Performed by: NURSE ANESTHETIST, CERTIFIED REGISTERED

## 2021-03-30 PROCEDURE — 01810 ANES PX NRV MUSC F/ARM WRST: CPT | Performed by: ANESTHESIOLOGY

## 2021-03-30 PROCEDURE — 76210000020 HC REC RM PH II FIRST 0.5 HR: Performed by: ORTHOPAEDIC SURGERY

## 2021-03-30 PROCEDURE — 77030010813: Performed by: ORTHOPAEDIC SURGERY

## 2021-03-30 PROCEDURE — 2709999900 HC NON-CHARGEABLE SUPPLY: Performed by: ORTHOPAEDIC SURGERY

## 2021-03-30 PROCEDURE — 76210000006 HC OR PH I REC 0.5 TO 1 HR: Performed by: ORTHOPAEDIC SURGERY

## 2021-03-30 PROCEDURE — 77030040922 HC BLNKT HYPOTHRM STRY -A: Performed by: ORTHOPAEDIC SURGERY

## 2021-03-30 PROCEDURE — 82962 GLUCOSE BLOOD TEST: CPT

## 2021-03-30 PROCEDURE — 74011250636 HC RX REV CODE- 250/636: Performed by: ORTHOPAEDIC SURGERY

## 2021-03-30 PROCEDURE — 77030013479 HC CUF TRNQ COT DGT MARM -A: Performed by: ORTHOPAEDIC SURGERY

## 2021-03-30 PROCEDURE — 77030040356 HC CORD BPLR FRCP COVD -A: Performed by: ORTHOPAEDIC SURGERY

## 2021-03-30 PROCEDURE — 74011250636 HC RX REV CODE- 250/636: Performed by: NURSE ANESTHETIST, CERTIFIED REGISTERED

## 2021-03-30 PROCEDURE — 76010000138 HC OR TIME 0.5 TO 1 HR: Performed by: ORTHOPAEDIC SURGERY

## 2021-03-30 RX ORDER — INSULIN LISPRO 100 [IU]/ML
INJECTION, SOLUTION INTRAVENOUS; SUBCUTANEOUS ONCE
Status: DISCONTINUED | OUTPATIENT
Start: 2021-03-30 | End: 2021-03-30 | Stop reason: HOSPADM

## 2021-03-30 RX ORDER — SODIUM CHLORIDE 0.9 % (FLUSH) 0.9 %
5-40 SYRINGE (ML) INJECTION AS NEEDED
Status: DISCONTINUED | OUTPATIENT
Start: 2021-03-30 | End: 2021-03-30 | Stop reason: HOSPADM

## 2021-03-30 RX ORDER — PROPOFOL 10 MG/ML
INJECTION, EMULSION INTRAVENOUS AS NEEDED
Status: DISCONTINUED | OUTPATIENT
Start: 2021-03-30 | End: 2021-03-30

## 2021-03-30 RX ORDER — SODIUM CHLORIDE 0.9 % (FLUSH) 0.9 %
5-40 SYRINGE (ML) INJECTION EVERY 8 HOURS
Status: DISCONTINUED | OUTPATIENT
Start: 2021-03-30 | End: 2021-03-30 | Stop reason: HOSPADM

## 2021-03-30 RX ORDER — BUPIVACAINE HYDROCHLORIDE 2.5 MG/ML
INJECTION, SOLUTION EPIDURAL; INFILTRATION; INTRACAUDAL AS NEEDED
Status: DISCONTINUED | OUTPATIENT
Start: 2021-03-30 | End: 2021-03-30 | Stop reason: HOSPADM

## 2021-03-30 RX ORDER — ONDANSETRON 2 MG/ML
INJECTION INTRAMUSCULAR; INTRAVENOUS AS NEEDED
Status: DISCONTINUED | OUTPATIENT
Start: 2021-03-30 | End: 2021-03-30 | Stop reason: HOSPADM

## 2021-03-30 RX ORDER — LIDOCAINE HYDROCHLORIDE 20 MG/ML
INJECTION, SOLUTION EPIDURAL; INFILTRATION; INTRACAUDAL; PERINEURAL AS NEEDED
Status: DISCONTINUED | OUTPATIENT
Start: 2021-03-30 | End: 2021-03-30

## 2021-03-30 RX ORDER — LIDOCAINE HYDROCHLORIDE 20 MG/ML
INJECTION, SOLUTION EPIDURAL; INFILTRATION; INTRACAUDAL; PERINEURAL AS NEEDED
Status: DISCONTINUED | OUTPATIENT
Start: 2021-03-30 | End: 2021-03-30 | Stop reason: HOSPADM

## 2021-03-30 RX ORDER — SODIUM CHLORIDE, SODIUM LACTATE, POTASSIUM CHLORIDE, CALCIUM CHLORIDE 600; 310; 30; 20 MG/100ML; MG/100ML; MG/100ML; MG/100ML
50 INJECTION, SOLUTION INTRAVENOUS CONTINUOUS
Status: DISCONTINUED | OUTPATIENT
Start: 2021-03-30 | End: 2021-03-30 | Stop reason: HOSPADM

## 2021-03-30 RX ORDER — KETOROLAC TROMETHAMINE 15 MG/ML
INJECTION, SOLUTION INTRAMUSCULAR; INTRAVENOUS AS NEEDED
Status: DISCONTINUED | OUTPATIENT
Start: 2021-03-30 | End: 2021-03-30 | Stop reason: HOSPADM

## 2021-03-30 RX ORDER — FENTANYL CITRATE 50 UG/ML
INJECTION, SOLUTION INTRAMUSCULAR; INTRAVENOUS AS NEEDED
Status: DISCONTINUED | OUTPATIENT
Start: 2021-03-30 | End: 2021-03-30 | Stop reason: HOSPADM

## 2021-03-30 RX ORDER — FENTANYL CITRATE 50 UG/ML
25 INJECTION, SOLUTION INTRAMUSCULAR; INTRAVENOUS AS NEEDED
Status: DISCONTINUED | OUTPATIENT
Start: 2021-03-30 | End: 2021-03-30 | Stop reason: HOSPADM

## 2021-03-30 RX ORDER — PROPOFOL 10 MG/ML
INJECTION, EMULSION INTRAVENOUS AS NEEDED
Status: DISCONTINUED | OUTPATIENT
Start: 2021-03-30 | End: 2021-03-30 | Stop reason: HOSPADM

## 2021-03-30 RX ORDER — FENTANYL CITRATE 50 UG/ML
50 INJECTION, SOLUTION INTRAMUSCULAR; INTRAVENOUS
Status: DISCONTINUED | OUTPATIENT
Start: 2021-03-30 | End: 2021-03-30 | Stop reason: HOSPADM

## 2021-03-30 RX ORDER — FAMOTIDINE 20 MG/1
20 TABLET, FILM COATED ORAL ONCE
Status: COMPLETED | OUTPATIENT
Start: 2021-03-30 | End: 2021-03-30

## 2021-03-30 RX ORDER — PROPOFOL 10 MG/ML
VIAL (ML) INTRAVENOUS
Status: DISCONTINUED | OUTPATIENT
Start: 2021-03-30 | End: 2021-03-30

## 2021-03-30 RX ORDER — MAGNESIUM SULFATE 100 %
4 CRYSTALS MISCELLANEOUS AS NEEDED
Status: DISCONTINUED | OUTPATIENT
Start: 2021-03-30 | End: 2021-03-30 | Stop reason: HOSPADM

## 2021-03-30 RX ORDER — PROPOFOL 10 MG/ML
VIAL (ML) INTRAVENOUS
Status: DISCONTINUED | OUTPATIENT
Start: 2021-03-30 | End: 2021-03-30 | Stop reason: HOSPADM

## 2021-03-30 RX ORDER — CEFAZOLIN SODIUM 2 G/50ML
2 SOLUTION INTRAVENOUS ONCE
Status: COMPLETED | OUTPATIENT
Start: 2021-03-30 | End: 2021-03-30

## 2021-03-30 RX ORDER — DEXTROSE 50 % IN WATER (D50W) INTRAVENOUS SYRINGE
25-50 AS NEEDED
Status: DISCONTINUED | OUTPATIENT
Start: 2021-03-30 | End: 2021-03-30 | Stop reason: HOSPADM

## 2021-03-30 RX ORDER — ONDANSETRON 2 MG/ML
4 INJECTION INTRAMUSCULAR; INTRAVENOUS ONCE
Status: DISCONTINUED | OUTPATIENT
Start: 2021-03-30 | End: 2021-03-30 | Stop reason: HOSPADM

## 2021-03-30 RX ADMIN — FENTANYL CITRATE 50 MCG: 50 INJECTION, SOLUTION INTRAMUSCULAR; INTRAVENOUS at 08:38

## 2021-03-30 RX ADMIN — LIDOCAINE HYDROCHLORIDE 100 MG: 20 INJECTION, SOLUTION EPIDURAL; INFILTRATION; INTRACAUDAL; PERINEURAL at 08:32

## 2021-03-30 RX ADMIN — PROPOFOL 120 MCG/KG/MIN: 10 INJECTION, EMULSION INTRAVENOUS at 08:33

## 2021-03-30 RX ADMIN — KETOROLAC TROMETHAMINE 15 MG: 15 INJECTION, SOLUTION INTRAMUSCULAR; INTRAVENOUS at 08:40

## 2021-03-30 RX ADMIN — SODIUM CHLORIDE, SODIUM LACTATE, POTASSIUM CHLORIDE, AND CALCIUM CHLORIDE 50 ML/HR: 600; 310; 30; 20 INJECTION, SOLUTION INTRAVENOUS at 08:10

## 2021-03-30 RX ADMIN — ONDANSETRON 4 MG: 2 INJECTION INTRAMUSCULAR; INTRAVENOUS at 08:40

## 2021-03-30 RX ADMIN — PROPOFOL 20 MG: 10 INJECTION, EMULSION INTRAVENOUS at 08:59

## 2021-03-30 RX ADMIN — CEFAZOLIN SODIUM 2 G: 2 SOLUTION INTRAVENOUS at 08:33

## 2021-03-30 RX ADMIN — PROPOFOL 40 MG: 10 INJECTION, EMULSION INTRAVENOUS at 08:32

## 2021-03-30 RX ADMIN — FAMOTIDINE 20 MG: 20 TABLET ORAL at 08:10

## 2021-03-30 NOTE — PERIOP NOTES
Assumed care of pt from OR via stretcher. Attached to monitor. VSS. OR, MAR and anesthesia report appreciated.

## 2021-03-30 NOTE — ANESTHESIA POSTPROCEDURE EVALUATION
Procedure(s):  LEFT MIDDLE FINGER MASS EXCISION. MAC    Anesthesia Post Evaluation      Multimodal analgesia: multimodal analgesia used between 6 hours prior to anesthesia start to PACU discharge  Patient location during evaluation: bedside  Patient participation: complete - patient participated  Level of consciousness: awake  Pain score: 0  Pain management: adequate  Airway patency: patent  Anesthetic complications: no  Cardiovascular status: stable  Respiratory status: acceptable  Hydration status: acceptable  Post anesthesia nausea and vomiting:  controlled  Final Post Anesthesia Temperature Assessment:  Normothermia (36.0-37.5 degrees C)      INITIAL Post-op Vital signs:   Vitals Value Taken Time   BP 93/62 03/30/21 0938   Temp 36.3 °C (97.3 °F) 03/30/21 0938   Pulse 59 03/30/21 0946   Resp 9 03/30/21 0946   SpO2 99 % 03/30/21 0941   Vitals shown include unvalidated device data.

## 2021-03-30 NOTE — OP NOTES
Operative Report    Patient: Abdon Machuca MRN: [id number]  SSN: [id number]    YOB: 1970  Age: 45 y.o. Sex: female       Date of Surgery: 3/30/2021     Preoperative Diagnosis: M67.442 DIGITAL MUCOUS CYST OF LEFT HAND     Postoperative Diagnosis: M67.442 DIGITAL MUCOUS CYST OF LEFT HAND     Surgeon(s) and Role:     Rere Ghotra, Jared Turpin, DO - Primary    Assistant Jose Zafar    Anesthesia: MAC and local    Procedure: Procedure(s):  LEFT MIDDLE FINGER MASS EXCISION 69090    Findings: Decompressed mucous cyst about the radial dorsal aspect of the DIP joint with impingement on the germinal matrix. Procedure in Detail:     Indications for procedure been outlined in the perioperative documentation most notably being refractory to conservative treatment. Informed consent was obtained from the patient. The risks and benefits of the procedure were discussed with the patient. They include but are not limited to neurovascular injury, blood loss, infection, tendon injury, nailbed deformity, loss of nail growth, chronic pain, chronic stiffness, hematoma, skin flap necrosis, need for further surgery, recurrence of cyst, complications from anesthesia including death, the possibility of aracelis Covid. After informed consent was obtained from the patient, she was taken back to the operative suite. The left upper extremity was placed on a hand table and 10 mL of quarter percent Marcaine plain was injected into the base of the middle finger in the form of a digital block. The left upper extremity was prepped and draped in the normal sterile fashion. A finger tourniquet was applied to the middle finger. A radial-based incision was made over the dorsal aspect of the middle finger from the base of the nail by approximately 1 cm proximal.  Skin flap was elevated. The cyst was identified and a small portion of cyst was left adhered to the skin. The base of the cyst was removed and sent as a specimen. Electrocautery was used to cauterize the root at the level of the distal interphalangeal joint. There was no osteophyte palpable that needed to be removed. The wound was copiously irrigated and closed with 5-0 chromic in interrupted fashion. The patient was placed into a sterile dressing and tourniquet removed. The patient was sent to recovery in stable condition. She was given appropriate wound care instructions. She denied needing any pain medicine and said she would take an anti-inflammatory that she has at home. She was also given appropriate follow-up with me in the outpatient setting. Estimated Blood Loss: Minimal    Tourniquet Time: * No tourniquets in log *      Implants: * No implants in log *            Specimens:   ID Type Source Tests Collected by Time Destination   1 : Middle finger cyst Preservative Hand  Roman Dominique DO 3/30/2021 0855 Pathology           Drains: None                Complications: None    Counts: Sponge and needle counts were correct times two.     Signed By:  Rylee Scott DO     March 30, 2021

## 2021-03-30 NOTE — ANESTHESIA PREPROCEDURE EVALUATION
Relevant Problems   RENAL FAILURE   (+) Calculus of kidney      ENDOCRINE   (+) Type 2 diabetes mellitus without complication (HCC)       Anesthetic History   No history of anesthetic complications            Review of Systems / Medical History  Patient summary reviewed and pertinent labs reviewed    Pulmonary  Within defined limits                 Neuro/Psych   Within defined limits           Cardiovascular                  Exercise tolerance: >4 METS     GI/Hepatic/Renal  Within defined limits              Endo/Other    Diabetes (Prediabetic)         Other Findings            Physical Exam    Airway  Mallampati: II  TM Distance: 4 - 6 cm  Neck ROM: normal range of motion   Mouth opening: Normal     Cardiovascular  Regular rate and rhythm,  S1 and S2 normal,  no murmur, click, rub, or gallop             Dental  No notable dental hx       Pulmonary  Breath sounds clear to auscultation               Abdominal  GI exam deferred       Other Findings            Anesthetic Plan    ASA: 2  Anesthesia type: MAC          Induction: Intravenous  Anesthetic plan and risks discussed with: Patient

## 2021-03-30 NOTE — BRIEF OP NOTE
Brief Postoperative Note    Patient: Jarod Mercedes  YOB: 1970  MRN: [id number]    Date of Procedure: 3/30/2021     Pre-Op Diagnosis: M67.442 DIGITAL MUCOUS CYST OF LEFT HAND    Post-Op Diagnosis: Same as preoperative diagnosis. Procedure(s):  LEFT MIDDLE FINGER MASS EXCISION    Surgeon(s):   Matt Kay DO    Surgical Assistant: Surg Asst-1: Prince Sinha    Anesthesia: MAC + Local    Estimated Blood Loss (mL): Minimal    Complications: None    Specimens:   ID Type Source Tests Collected by Time Destination   1 : Middle finger cyst Preservative Hand  Matt Kay DO 3/30/2021 0855 Pathology        Implants: * No implants in log *    Drains: * No LDAs found *    Findings: decompressed digital mucus cyst    Electronically Signed by Chris Dallas DO on 3/30/2021 at 9:16 AM

## 2021-03-30 NOTE — H&P
Update History & Physical    The Patient's History and Physical of March 25, 2021 was reviewed with the patient and I examined the patient. There was no change. The surgical site was confirmed by the patient and me. Plan:  The risk, benefits, expected outcome, and alternative to the recommended procedure have been discussed with the patient. Patient understands and wants to proceed with the procedure.     Electronically signed by Ary Garcia DO on 3/30/2021 at 8:11 AM

## 2021-03-30 NOTE — DISCHARGE INSTRUCTIONS
Keep dressing clean and dry and do not remove. Cover with plastic bag and rubber band for showering. Ice and elevate finger is much as possible. No Pain medicine prescribed at request of patient who plans to take anti-inflammatories she has at home. DISCHARGE SUMMARY from Nurse    PATIENT INSTRUCTIONS:    After general anesthesia or intravenous sedation, for 24 hours or while taking prescription Narcotics:  · Limit your activities  · Do not drive and operate hazardous machinery  · Do not make important personal or business decisions  · Do  not drink alcoholic beverages  · If you have not urinated within 8 hours after discharge, please contact your surgeon on call. Report the following to your surgeon:  · Excessive pain, swelling, redness or odor of or around the surgical area  · Temperature over 100.5  · Nausea and vomiting lasting longer than 4 hours or if unable to take medications  · Any signs of decreased circulation or nerve impairment to extremity: change in color, persistent  numbness, tingling, coldness or increase pain  · Any questions    What to do at Home:  Recommended activity: Activity as tolerated,     *  Please update this list whenever your medications are discontinued, doses are      changed, or new medications (including over-the-counter products) are added. *  Please carry medication information at all times in case of emergency situations. These are general instructions for a healthy lifestyle:    No smoking/ No tobacco products/ Avoid exposure to second hand smoke  Surgeon General's Warning:  Quitting smoking now greatly reduces serious risk to your health. Obesity, smoking, and sedentary lifestyle greatly increases your risk for illness    A healthy diet, regular physical exercise & weight monitoring are important for maintaining a healthy lifestyle    The discharge information has been reviewed with the patient. The patient verbalized understanding.   Discharge medications reviewed with the patient and appropriate educational materials and side effects teaching were provided.     Patient armband removed and shredded  ___________________________________________________________________________________________________________________________________

## 2021-04-08 ENCOUNTER — HOSPITAL ENCOUNTER (OUTPATIENT)
Dept: PHYSICAL THERAPY | Age: 51
Discharge: HOME | End: 2021-04-08
Payer: COMMERCIAL

## 2021-04-08 PROCEDURE — 97110 THERAPEUTIC EXERCISES: CPT

## 2021-04-08 PROCEDURE — 97016 VASOPNEUMATIC DEVICE THERAPY: CPT

## 2021-04-08 PROCEDURE — 97140 MANUAL THERAPY 1/> REGIONS: CPT

## 2021-04-08 NOTE — PROGRESS NOTES
PT DAILY TREATMENT NOTE     Patient Name: Erik Malagon  Date:2021  : 1970  [x]  Patient  Verified  Payor: BLUE CROSS / Plan: [facility] / Product Type: PPO /    In time:0700  Out time:0755  Total Treatment Time (min): 55  Visit #:3 of 4    Medicare/BCBS Only   Total Timed Codes (min):  45 1:1 Treatment Time:  45       Treatment Area: Left shoulder pain [M25.512]    SUBJECTIVE  Pain Level (0-10 scale): 4  Any medication changes, allergies to medications, adverse drug reactions, diagnosis change, or new procedure performed?: [x] No    [] Yes (see summary sheet for update)  Subjective functional status/changes:   [] No changes reported  The pt reports increased pain as of recent. She reports possibly from increase in gardening. She also reports limited HEP compliance.      OBJECTIVE    Modality rationale: decrease inflammation and decrease pain to improve the patients ability to perform ADL   Min Type Additional Details    [] Estim:  []Unatt       []IFC  []Premod                        []Other:  []w/ice   []w/heat  Position:  Location:    [] Estim: []Att    []TENS instruct  []NMES                    []Other:  []w/US   []w/ice   []w/heat  Position:  Location:    []  Traction: [] Cervical       []Lumbar                       [] Prone          []Supine                       []Intermittent   []Continuous Lbs:  [] before manual  [] after manual    []  Ultrasound: []Continuous   [] Pulsed                           []1MHz   []3MHz W/cm2:  Location:    []  Iontophoresis with dexamethasone         Location: [] Take home patch   [] In clinic    []  Ice     []  heat  []  Ice massage  []  Laser   []  Anodyne Position:  Location:    []  Laser with stim  []  Other:  Position:  Location:   10 []  Vasopneumatic Device Pressure:       [x] lo [] med [] hi   Temperature: [x] lo [] med [] hi   [] Skin assessment post-treatment:  []intact []redness- no adverse reaction    []redness  adverse reaction: 35 min Therapeutic Exercise:  [x] See flow sheet :   Rationale: increase ROM and increase strength to improve the patients ability to perform functional tasks. 10 min Manual Therapy:  Pt right sidelying- STJ mobs, pt supine- GHJ Grade III inferior and posterior glides, PROM of the left shoulder in all planes of motion. MFR to left latissimus dorsi. The manual therapy interventions were performed at a separate and distinct time from the therapeutic activities interventions. Rationale: decrease pain, increase ROM and increase tissue extensibility to perform ADL            With   [] TE   [] TA   [] neuro   [] other: Patient Education: [x] Review HEP    [] Progressed/Changed HEP based on:   [] positioning   [] body mechanics   [] transfers   [] heat/ice application    [] other:      Other Objective/Functional Measures: increased reps with wall slides and added wt with sidelying abd, ER     Pain Level (0-10 scale) post treatment: 0    ASSESSMENT/Changes in Function: The pt with some pain increase as of recent, possibly due to her increase in activity but she has also not been fully compliant with HEP. Patient will continue to benefit from skilled PT services to modify and progress therapeutic interventions, address functional mobility deficits, address ROM deficits, address strength deficits, analyze and address soft tissue restrictions and analyze and cue movement patterns to attain remaining goals. []  See Plan of Care  []  See progress note/recertification  []  See Discharge Summary         Progress towards goals / Updated goals:  Short Term Goals: To be accomplished in 1 weeks:               1. Pt pt will be I and compliant with HEP               IE- issued HEP              Current: reports compliance on 3-24-21  Long Term Goals: To be accomplished in 3-4 weeks:               1. Improve FOTO to 74 for improved ability for daily tasks               IE- 61               2.  Improve left shoulder AROM flexion to 155 degrees to improve ability for daily tasks               IE- 142 degrees   Current: limited change 4-8-21               3. Improve left shoulder AROM ABD to 140 degrees for improved ability for ADL               IE- 128 degrees               4.  The pt will report no difficulty with reaching a overhead shelf               IE- some difficulty    PLAN  []  Upgrade activities as tolerated     [x]  Continue plan of care  []  Update interventions per flow sheet       []  Discharge due to:_  []  Other:_      Gurdeep Kenyon, PT 4/8/2021  7:48 AM    Future Appointments   Date Time Provider Alondra Mayo   4/12/2021  8:00 AM Roque Membreno,  [facility]   4/22/2021  7:00 AM Susan Oden, PT [facility]

## 2021-04-12 ENCOUNTER — OFFICE VISIT (OUTPATIENT)
Dept: ORTHOPEDIC SURGERY | Age: 51
End: 2021-04-12
Payer: COMMERCIAL

## 2021-04-12 VITALS — HEART RATE: 89 BPM | HEIGHT: 65 IN | OXYGEN SATURATION: 99 % | WEIGHT: 160 LBS | BODY MASS INDEX: 26.66 KG/M2

## 2021-04-12 DIAGNOSIS — M15.1 DEGENERATIVE ARTHRITIS OF DISTAL INTERPHALANGEAL JOINT OF MIDDLE FINGER OF LEFT HAND: ICD-10-CM

## 2021-04-12 DIAGNOSIS — M67.442 DIGITAL MUCOUS CYST OF LEFT HAND: Primary | ICD-10-CM

## 2021-04-12 PROCEDURE — 99024 POSTOP FOLLOW-UP VISIT: CPT | Performed by: ORTHOPAEDIC SURGERY

## 2021-04-12 NOTE — PROGRESS NOTES
Sapphire Malik is a 45 y.o. female right handed unspecified employment. Worker's Compensation and legal considerations: none filed. Vitals:    04/12/21 0804   Pulse: 89   SpO2: 99%   Weight: 160 lb (72.6 kg)   Height: 5' 5\" (1.651 m)           Chief Complaint   Patient presents with    Hand Pain     left middle finger         HPI: Patient returns today 2 weeks status post left middle finger mucous cyst excision. She reports minimal pain and has been putting Neosporin on her wound. Initial HPI: Patient presents today with complaints of a mass over the back of her left middle finger near the tip. She reports pain about the mass whenever she bumps it.     Date of onset: 2020    Injury: No    Prior Treatment:  Yes: Comment: left middle finger mucous cyst excision    Numbness/ Tingling: No      ROS: Review of Systems - General ROS: negative  Psychological ROS: negative  ENT ROS: negative  Allergy and Immunology ROS: negative  Hematological and Lymphatic ROS: negative  Respiratory ROS: no cough, shortness of breath, or wheezing  Cardiovascular ROS: no chest pain or dyspnea on exertion  Gastrointestinal ROS: no abdominal pain, change in bowel habits, or black or bloody stools  Musculoskeletal ROS: positive for - pain in finger - left  Neurological ROS: negative  Dermatological ROS: negative    Past Medical History:   Diagnosis Date    Calculus of kidney     Calculus of ureter     Diabetes ([facility] [address]     pre diabetes    Hypercalciuria     Hyperuricosuria     Ill-defined condition     HX Meningioma    Ill-defined condition     Frozen LT shoulder    Left flank pain     Meningioma ([facility] [address] 2016    s/p resection 8/2/2016 dr Barbra Bishop Urinary frequency        Past Surgical History:   Procedure Laterality Date    HX MOHS PROCEDURES  10/7/15    HX ORTHOPAEDIC  11/2015 and 12/2015    Manipulation of RT frozen shoulder    NEUROLOGICAL PROCEDURE UNLISTED  07/2015    Removal of Brain Meningioma       Current Outpatient Medications   Medication Sig Dispense Refill    celecoxib (CeleBREX) 200 mg capsule Take 1 Cap by mouth two (2) times a day. 200 MG BID X 4 days, then once per day after 4 days. 60 Cap 0    multivitamin (THERAPEUTIC LIQUID) liquid Take 10 mL by mouth.  lancets (ONETOUCH DELICA LANCETS) 30 gauge misc 1 Device.  glucose blood VI test strips (ONETOUCH VERIO) strip 50 Each. Allergies   Allergen Reactions    Quinton Anaphylaxis    Prednisone Other (comments)     Altered mental status           PE:     Physical Exam  Vitals signs and nursing note reviewed. Constitutional:       General: She is not in acute distress. Appearance: Normal appearance. She is not ill-appearing, toxic-appearing or diaphoretic. Neck:      Musculoskeletal: Normal range of motion and neck supple. Cardiovascular:      Pulses: Normal pulses. Pulmonary:      Effort: Pulmonary effort is normal.   Musculoskeletal: Normal range of motion. General: Tenderness present. No swelling, deformity or signs of injury. Right lower leg: No edema. Left lower leg: No edema. Skin:     General: Skin is warm and dry. Capillary Refill: Capillary refill takes less than 2 seconds. Findings: No bruising or erythema. Neurological:      General: No focal deficit present. Mental Status: She is alert and oriented to person, place, and time. Cranial Nerves: No cranial nerve deficit. Sensory: No sensory deficit. Psychiatric:         Mood and Affect: Mood normal.         Behavior: Behavior normal.            Left hand: Incision is clean and dry with some area of raw granulation tissue at the incision site. Sensation is grossly intact distally and cap refill is brisk. Range of motion is full. Imagin2021 2 views of left middle finger does not show any fracture, dislocation, degenerative changes, or any other osseous abnormalities. ICD-10-CM ICD-9-CM    1.  Digital mucous cyst of left hand  M67.442 727.43    2. 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 Follow-up and Dispositions    · Return if symptoms worsen or fail to improve.           Plan was reviewed with patient, who verbalized agreement and understanding of the plan

## 2021-04-22 ENCOUNTER — HOSPITAL ENCOUNTER (OUTPATIENT)
Dept: PHYSICAL THERAPY | Age: 51
Discharge: HOME | End: 2021-04-22
Payer: COMMERCIAL

## 2021-04-22 PROCEDURE — 97110 THERAPEUTIC EXERCISES: CPT

## 2021-04-22 PROCEDURE — 97140 MANUAL THERAPY 1/> REGIONS: CPT

## 2021-04-22 NOTE — PROGRESS NOTES
PT DAILY TREATMENT NOTE     Patient Name: Madelin Hawthorne  Date:2021  : 1970  [x]  Patient  Verified  Payor: BLUE CROSS / Plan: [address] [place] [address] [place] / Product Type: PPO /    In time:0700  Out time:0755  Total Treatment Time (min): 55  Visit #: 1 of 2-4    Medicare/BCBS Only   Total Timed Codes (min):  45 1:1 Treatment Time:  45       Treatment Area: Left shoulder pain [M25.512]    SUBJECTIVE  Pain Level (0-10 scale): 3-4  Any medication changes, allergies to medications, adverse drug reactions, diagnosis change, or new procedure performed?: [x] No    [] Yes (see summary sheet for update)  Subjective functional status/changes:   [] No changes reported  The pt reports she feels her shoulder is doing better but it still has moments.      OBJECTIVE    Modality rationale: decrease inflammation and decrease pain to improve the patients ability to perform ADL   Min Type Additional Details    [] Estim:  []Unatt       []IFC  []Premod                        []Other:  []w/ice   []w/heat  Position:  Location:    [] Estim: []Att    []TENS instruct  []NMES                    []Other:  []w/US   []w/ice   []w/heat  Position:  Location:    []  Traction: [] Cervical       []Lumbar                       [] Prone          []Supine                       []Intermittent   []Continuous Lbs:  [] before manual  [] after manual    []  Ultrasound: []Continuous   [] Pulsed                           []1MHz   []3MHz W/cm2:  Location:    []  Iontophoresis with dexamethasone         Location: [] Take home patch   [] In clinic    []  Ice     []  heat  []  Ice massage  []  Laser   []  Anodyne Position:  Location:    []  Laser with stim  []  Other:  Position:  Location:   10 [x]  Vasopneumatic Device Pressure:       [x] lo [] med [] hi   Temperature: [x] lo [] med [] hi   [] Skin assessment post-treatment:  []intact []redness- no adverse reaction    []redness  adverse reaction:       35 min Therapeutic Exercise:  [x] See flow sheet :   Rationale: increase ROM and increase strength to improve the patients ability to perform ADL      10 min Manual Therapy:  Pt right sidelying- STJ clocks, pt supine GHJ grade III inferior and posterior glides, PROM to left shoulder in all planes. The manual therapy interventions were performed at a separate and distinct time from the therapeutic activities interventions. Rationale: decrease pain, increase ROM and increase tissue extensibility to perform ADL            With   [] TE   [] TA   [] neuro   [] other: Patient Education: [x] Review HEP    [] Progressed/Changed HEP based on:   [] positioning   [] body mechanics   [] transfers   [] heat/ice application    [] other:      Other Objective/Functional Measures: see PN     Pain Level (0-10 scale) post treatment: 0    ASSESSMENT/Changes in Function: The pt reports improvement in the left shoulder but still having pain. AROM has improved as noted below. The pt will benefit from continuation to further her current progress. Patient will continue to benefit from skilled PT services to modify and progress therapeutic interventions, address functional mobility deficits, address ROM deficits, address strength deficits and analyze and address soft tissue restrictions to attain remaining goals. []  See Plan of Care  [x]  See progress note/recertification  []  See Discharge Summary         Progress towards goals / Updated goals:  Short Term Goals: To be accomplished in 1 weeks:               1. Pt pt will be I and compliant with HEP               IE- issued HEP              [id number]: reports compliance on 3-24-21  Long Term Goals: To be accomplished in 3-4 weeks:               1. Improve FOTO to 74 for improved ability for daily tasks               IE- 61   Current: regressed to 58 but reports improvement 4-22-21               2.  Improve left shoulder AROM flexion to 155 degrees to improve ability for daily tasks               IE- 142 degrees Current: progressing 150 degrees on 4-22-21               3. Improve left shoulder AROM ABD to 140 degrees for improved ability for ADL               IE- 128 degrees   Current: 133 degrees on 4-22-21               4.  The pt will report no difficulty with reaching a overhead shelf               IE- some difficulty   Current: some difficulty on 4-22-21    PLAN  []  Upgrade activities as tolerated     [x]  Continue plan of care  []  Update interventions per flow sheet       []  Discharge due to:_  []  Other:_      Quan Cruz, PT 4/22/2021  7:04 AM    Future Appointments   Date Time Provider Alondra Mayo   4/29/2021  7:00 AM Peggy Mcmahon, PT [facility]

## 2021-04-22 NOTE — PROGRESS NOTES
[facility]  [address]  [place], [address]  [phone] fax    Physical Therapy Progress Note  Patient name: Italia Shrestha Start of Care: 3/18/2021   Referral source: Karlosdalia Mills,* : 1970                Medical Diagnosis: Left shoulder pain [M25.512]  Payor: [employer] / Plan: [facility] [employer] / Product Type: PPO /  Onset Date:2021                Treatment Diagnosis: left shoulder pain   Prior Hospitalization: see medical history Provider#: [id number]   Medications: Verified on Patient summary List    Comorbidities: diabetes, right frozen shoulder, brain surgery   Prior Level of Function: functionally I with all activities    Visits from Start of Care: 4    Missed Visits: 1             Key Functional Changes: The pt reports improvement in the left shoulder but still having pain. The pt's AROM of the left shoulder has improved as noted below. Her FOTO score declined slightly but she reports improvement since starting PT. Her progress is likely slow due to limited frequency of appointments. The pt will benefit from continuation to further her current progress. Progress towards goals   Short Term Goals: To be accomplished in 1 weeks:               1. Pt pt will be I and compliant with HEP               IE- issued HEP              WPIUPZA: reports compliance on 3-24-21  Long Term Goals: To be accomplished in 3-4 weeks:               1. Improve FOTO to 74 for improved ability for daily tasks               IE- 61   Current: regressed to 58 but reports improvement 21               2.  Improve left shoulder AROM flexion to 155 degrees to improve ability for daily tasks               IE- 142 degrees              Current: progressing 150 degrees on 21               3. Improve left shoulder AROM ABD to 140 degrees for improved ability for ADL               IE- 128 degrees   Current: 133 degrees on 4-22-21               4. The pt will report no difficulty with reaching a overhead shelf               IE- some difficulty   Current: some difficulty on 4-22-21    Updated Goals: to be achieved in 2-3 weeks:         1. Improve FOTO to 74 for improved ability for daily tasks               PN- 58               2. Improve left shoulder AROM flexion to 155 degrees to improve ability for daily tasks               PN- 150 degrees               3. Improve left shoulder AROM ABD to 140 degrees for improved ability for ADL               PN- 133 degrees               4. The pt will report no difficulty with reaching a overhead shelf               PN- some difficulty       ASSESSMENT/RECOMMENDATIONS:  [x]Continue therapy per initial plan/protocol at a frequency of  1-2 x per week for 2-3 weeks  []Continue therapy with the following recommended changes:_____________________      _____________________________________________________________________  []Discontinue therapy progressing towards or have reached established goals  []Discontinue therapy due to lack of appreciable progress towards goals  []Discontinue therapy due to lack of attendance or compliance  []Await Physician's recommendations/decisions regarding therapy  []Other:________________________________________________________________    Thank you for this referral.    Quan Cruz, PT 4/22/2021 7:59 AM  NOTE TO PHYSICIAN:  PLEASE COMPLETE THE ORDERS BELOW AND   FAX TO [facility]: [phone]  If you are unable to process this request in 24 hours please contact our office: [phone] I have read the above report and request that my patient continue as recommended. ? I have read the above report and request that my patient continue therapy with the following changes/special instructions:__________________________________________________________  ?  I have read the above report and request that my patient be discharged from therapy.     Physicians signature: ______________________________Date: ______Time:______     Padilla Nobles

## 2021-04-29 ENCOUNTER — APPOINTMENT (OUTPATIENT)
Dept: PHYSICAL THERAPY | Age: 51
End: 2021-04-29
Payer: COMMERCIAL

## 2021-05-06 ENCOUNTER — HOSPITAL ENCOUNTER (OUTPATIENT)
Dept: PHYSICAL THERAPY | Age: 51
Discharge: HOME | End: 2021-05-06
Payer: COMMERCIAL

## 2021-05-06 PROCEDURE — 97140 MANUAL THERAPY 1/> REGIONS: CPT

## 2021-05-06 PROCEDURE — 97016 VASOPNEUMATIC DEVICE THERAPY: CPT

## 2021-05-06 PROCEDURE — 97110 THERAPEUTIC EXERCISES: CPT

## 2021-05-06 NOTE — PROGRESS NOTES
PT DAILY TREATMENT NOTE     Patient Name: Tim Escalera  Date:2021  : 1970  [x]  Patient  Verified  Payor: BLUE CROSS / Plan: [address] [place] [address] Sam Rayburn / Product Type: PPO /    In time:830  Out time:925  Total Treatment Time (min): 55  Visit #: 1 of -    Medicare/BCBS Only   Total Timed Codes (min):  45 1:1 Treatment Time:  45       Treatment Area: Left shoulder pain [M25.512]    SUBJECTIVE  Pain Level (0-10 scale): 5-6  Any medication changes, allergies to medications, adverse drug reactions, diagnosis change, or new procedure performed?: [x] No    [] Yes (see summary sheet for update)  Subjective functional status/changes:   [] No changes reported  The pt reports she has had increased pain and decreased ROM. She feels it is just part of the process of frozen shoulder, as she has had frozen shoulder in the past. She reports HEP compliance.      OBJECTIVE    Modality rationale: decrease inflammation and decrease pain to improve the patients ability to perform ADL   Min Type Additional Details    [] Estim:  []Unatt       []IFC  []Premod                        []Other:  []w/ice   []w/heat  Position:  Location:    [] Estim: []Att    []TENS instruct  []NMES                    []Other:  []w/US   []w/ice   []w/heat  Position:  Location:    []  Traction: [] Cervical       []Lumbar                       [] Prone          []Supine                       []Intermittent   []Continuous Lbs:  [] before manual  [] after manual    []  Ultrasound: []Continuous   [] Pulsed                           []1MHz   []3MHz W/cm2:  Location:    []  Iontophoresis with dexamethasone         Location: [] Take home patch   [] In clinic    []  Ice     []  heat  []  Ice massage  []  Laser   []  Anodyne Position:  Location:    []  Laser with stim  []  Other:  Position:  Location:   10 [x]  Vasopneumatic Device Pressure:       [x] lo [] med [] hi   Temperature: [x] lo [] med [] hi   [] Skin assessment post-treatment:  []intact []redness- no adverse reaction    []redness  adverse reaction:       30 min Therapeutic Exercise:  [x] See flow sheet :   Rationale: increase ROM and increase strength to improve the patients ability to perform ADL      15 min Manual Therapy:  Pt right sidelying- STJ mobs/clocks. Pt supine - GHJ grade III/IV inferior and posterior glides, PROM of the left shoulder in all planes. The manual therapy interventions were performed at a separate and distinct time from the therapeutic activities interventions. Rationale: decrease pain, increase ROM and increase tissue extensibility to perform ADl. With   [] TE   [] TA   [] neuro   [] other: Patient Education: [x] Review HEP    [] Progressed/Changed HEP based on:   [] positioning   [] body mechanics   [] transfers   [] heat/ice application    [] other:      Other Objective/Functional Measures: IR: L1 (previous WFL)     Pain Level (0-10 scale) post treatment: 2-3    ASSESSMENT/Changes in Function:  The pt demonstrates some regression with AROM IR and ER and pain increase. Increase in capsular tightness noted with mobilization/manual today. Instructed pt to contact MD to schedule f/u appointment. Decreased pain was noted post treatment. Patient will continue to benefit from skilled PT services to modify and progress therapeutic interventions, address functional mobility deficits, address ROM deficits, address strength deficits, analyze and address soft tissue restrictions and analyze and cue movement patterns to attain remaining goals. []  See Plan of Care  []  See progress note/recertification  []  See Discharge Summary         Progress towards goals / Updated goals:  Updated Goals: to be achieved in 2-3 weeks:                    8. Improve FOTO to 74 for improved ability for daily tasks               PN- 58               2.  Improve left shoulder AROM flexion to 155 degrees to improve ability for daily tasks               PN- 150 degrees   Current: 150 degrees on 5-6-21               3. Improve left shoulder AROM ABD to 140 degrees for improved ability for ADL               PN- 133 degrees   Current: 133 degrees on 5-6-21               4. The pt will report no difficulty with reaching a overhead shelf               PN- some difficulty     PLAN  []  Upgrade activities as tolerated     [x]  Continue plan of care  []  Update interventions per flow sheet       []  Discharge due to:_  []  Other:_      Samina Whitney, PT 5/6/2021  8:34 AM    No future appointments.

## 2021-05-11 ENCOUNTER — HOSPITAL ENCOUNTER (OUTPATIENT)
Dept: PHYSICAL THERAPY | Age: 51
Discharge: HOME | End: 2021-05-11
Payer: COMMERCIAL

## 2021-05-11 ENCOUNTER — OFFICE VISIT (OUTPATIENT)
Dept: ORTHOPEDIC SURGERY | Age: 51
End: 2021-05-11
Payer: COMMERCIAL

## 2021-05-11 VITALS
TEMPERATURE: 97.1 F | BODY MASS INDEX: 23.49 KG/M2 | OXYGEN SATURATION: 98 % | WEIGHT: 141 LBS | HEIGHT: 65 IN | HEART RATE: 81 BPM

## 2021-05-11 DIAGNOSIS — M75.02 ADHESIVE CAPSULITIS OF LEFT SHOULDER: Primary | ICD-10-CM

## 2021-05-11 PROCEDURE — 99214 OFFICE O/P EST MOD 30 MIN: CPT | Performed by: ORTHOPAEDIC SURGERY

## 2021-05-11 PROCEDURE — 97110 THERAPEUTIC EXERCISES: CPT

## 2021-05-11 PROCEDURE — 97140 MANUAL THERAPY 1/> REGIONS: CPT

## 2021-05-11 PROCEDURE — 20611 DRAIN/INJ JOINT/BURSA W/US: CPT | Performed by: ORTHOPAEDIC SURGERY

## 2021-05-11 RX ORDER — TRIAMCINOLONE ACETONIDE 40 MG/ML
40 INJECTION, SUSPENSION INTRA-ARTICULAR; INTRAMUSCULAR ONCE
Status: COMPLETED | OUTPATIENT
Start: 2021-05-11 | End: 2021-05-11

## 2021-05-11 RX ADMIN — TRIAMCINOLONE ACETONIDE 40 MG: 40 INJECTION, SUSPENSION INTRA-ARTICULAR; INTRAMUSCULAR at 14:07

## 2021-05-11 NOTE — PROGRESS NOTES
PT DAILY TREATMENT NOTE     Patient Name: Magaly Parcel  Date:2021  : 1970  [x]  Patient  Verified  Payor: BLUE CROSS / [facility] / Product Type: PPO /    In time:749  Out EQIQ:[id number]  Total Treatment Time (min):56  Visit #: 2 of 4-6    Medicare/BCBS Only   Total Timed Codes (min):  46 1:1 Treatment Time:  56       Treatment Area: Left shoulder pain [M25.512]    SUBJECTIVE  Pain Level (0-10 scale): 5  Any medication changes, allergies to medications, adverse drug reactions, diagnosis change, or new procedure performed?: [x] No    [] Yes (see summary sheet for update)  Subjective functional status/changes:   [] No changes reported  The pt reports her pain is fairly constant around a 5.      OBJECTIVE    Modality rationale: decrease inflammation and decrease pain to improve the patients ability to perform ADL   Min Type Additional Details    [] Estim:  []Unatt       []IFC  []Premod                        []Other:  []w/ice   []w/heat  Position:  Location:    [] Estim: []Att    []TENS instruct  []NMES                    []Other:  []w/US   []w/ice   []w/heat  Position:  Location:    []  Traction: [] Cervical       []Lumbar                       [] Prone          []Supine                       []Intermittent   []Continuous Lbs:  [] before manual  [] after manual    []  Ultrasound: []Continuous   [] Pulsed                           []1MHz   []3MHz W/cm2:  Location:    []  Iontophoresis with dexamethasone         Location: [] Take home patch   [] In clinic    []  Ice     []  heat  []  Ice massage  []  Laser   []  Anodyne Position:  Location:    []  Laser with stim  []  Other:  Position:  Location:   10 [x]  Vasopneumatic Device Pressure:       [x] lo [] med [] hi   Temperature: [x] lo [] med [] hi   [] Skin assessment post-treatment:  []intact []redness- no adverse reaction    []redness  adverse reaction:         31 min Therapeutic Exercise:  [x] See flow sheet :   Rationale: increase ROM and increase strength to improve the patients ability to perform ADL    15 min Manual Therapy:  Pt right sidelying- STJ mobs. Pt supine- performed grade III/IV post and inferior glides, PROM in all planes   The manual therapy interventions were performed at a separate and distinct time from the therapeutic activities interventions. Rationale: decrease pain, increase ROM and increase tissue extensibility to perform ADL            With   [] TE   [] TA   [] neuro   [] other: Patient Education: [x] Review HEP    [] Progressed/Changed HEP based on:   [] positioning   [] body mechanics   [] transfers   [] heat/ice application    [] other:      Other Objective/Functional Measures: increased tband reps     Pain Level (0-10 scale) post treatment: 0    ASSESSMENT/Changes in Function:  The pt demonstrates recent increase pain levels and increasing capsular tightness. She is compliant with HEP and has been attending PT sessions. She does have MD f/u later today. Gradual improvement in ROM noted today with manual and good pain relief post treatment. Patient will continue to benefit from skilled PT services to modify and progress therapeutic interventions, address functional mobility deficits, address ROM deficits, address strength deficits, analyze and address soft tissue restrictions and analyze and cue movement patterns to attain remaining goals. []  See Plan of Care  []  See progress note/recertification  []  See Discharge Summary         Progress towards goals / Updated goals:  Updated Goals: to be achieved in 2-3 weeks:                    1. Improve FOTO to 74 for improved ability for daily tasks               PN- 58               2.  Improve left shoulder AROM flexion to 155 degrees to improve ability for daily tasks               PN- 150 degrees              Current: 150 degrees on 5-6-21               3. Improve left shoulder AROM ABD to 140 degrees for improved ability for ADL               PN- 133 degrees              Current: 133 degrees on 5-6-21               4.  The pt will report no difficulty with reaching a overhead shelf               PN- some difficulty     PLAN  []  Upgrade activities as tolerated     [x]  Continue plan of care  []  Update interventions per flow sheet       []  Discharge due to:_  []  Other:_      Emile Azar, PT 5/11/2021  7:56 AM    Future Appointments   Date Time Provider Alondra Breeni   5/11/2021  1:20 PM Daniella Tse MD [facility]   5/17/2021  7:45 AM Juan Padilla, PT [facility]

## 2021-05-11 NOTE — PROGRESS NOTES
Cj Bates  1970   Chief Complaint   Patient presents with    Arm Pain     left arm    Shoulder Pain     left shoulder        HISTORY OF PRESENT ILLNESS  Cj Bates is a 45 y.o. female who presents today for reevaluation of left shoulder. Patient rates pain as 5/10 today. Pain has been present for over 3 months. Has hx of right frozen shoulder. Tried PT for 6 months and had two shoulder manipulations of her right shoulder. Describes a burning pain in the left shoulder. Has been going to PT for the left shoulder. PT helps some but she states she has had better days. Has also been trying massage and going to chiropractor for shoulder and neck. Still having limited ROM, especially with reaching behind. Having difficulty with sleeping, this is getting worse. She does note hx of a meningioma, states she has scar tissue in her brain from this. Patient denies any fever, chills, chest pain, shortness of breath or calf pain. The remainder of the review of systems is negative. There are no new illness or injuries to report since last seen in the office. There are no changes to medications, allergies, family or social history. Pain Assessment  5/11/2021   Location of Pain Shoulder;Arm   Location Modifiers Left   Severity of Pain 5   Quality of Pain Throbbing; Sharp;Dull;Aching;Burning; Other (Comment)   Quality of Pain Comment reduced nikhil   Duration of Pain Persistent   Frequency of Pain Constant   Aggravating Factors Stretching   Aggravating Factors Comment ROM   Limiting Behavior Yes   Relieving Factors Ice; Other (Comment)   Relieving Factors Comment deep tissue gun, massages   Result of Injury No       PHYSICAL EXAM:   Visit Vitals  Pulse 81   Temp 97.1 °F (36.2 °C) (Temporal)   Ht 5' 5\" (1.651 m)   Wt 141 lb (64 kg)   SpO2 98%   BMI 23.46 kg/m²     The patient is a well-developed, well-nourished female   in no acute distress. The patient is alert and oriented times three.   The patient is alert and oriented times three. Mood and affect are normal.  LYMPHATIC: lymph nodes are not enlarged and are within normal limits  SKIN: normal in color and non tender to palpation. There are no bruises or abrasions noted. NEUROLOGICAL: Motor sensory exam is within normal limits. Reflexes are equal bilaterally. There is normal sensation to pinprick and light touch  MUSCULOSKELETAL:  Examination Left shoulder   Skin Intact   AC joint tenderness -   Biceps tenderness -   Forward flexion/Elevation    Active abduction    Glenohumeral abduction 60   External rotation ROM 45   Internal rotation ROM 30   Apprehension -   Tacos Relocation -   Jerk -   Load and Shift -   Obriens -   Speeds -   Impingement sign +   Supraspinatus/Empty Can -, 5/5   External Rotation Strength -, 5/5   Lift Off/Belly Press -, 5/5   Neurovascular Intact        PROCEDURE: Left Shoulder Injection with Ultrasound Guidance    Indication:Left Shoulder pain/swelling    After sterile prep, 6 cc of Xylocaine and 1 cc of Kenalog were injected into the left Shoulder. Intra-articular Ultrasound images captured using [address] Ultrasound machine using a frequency of 10 MHz with a linear transducer and scanned into patient's chart.            [facility]  OFFICE PROCEDURE PROGRESS NOTE        Chart reviewed for the following:  Abi Brown M.D, have reviewed the History, Physical and updated the Allergic reactions for UlNeil Menonrandy 18 performed immediately prior to start of procedure:  Abi Brown M.D, have performed the following reviews on Tim Escalera prior to the start of the procedure:            * Patient was identified by name and date of birth   * Agreement on procedure being performed was verified  * Risks and Benefits explained to the patient  * Procedure site verified and marked as necessary  * Patient was positioned for comfort  * Needle placement confirmed by ultrasound  * Consent was signed and verified     Time: 1:48 PM     Date of procedure: 5/11/2021    Procedure performed by:  Keon Dickerson M.D    Provider assisted by: (see medication administration)    How tolerated by patient: tolerated the procedure well with no complications    Comments: none      IMAGING: XR of left shoulder with 3 views obtained in the office dated 3/16/2021 was reviewed and read by Dr. Cruz Walton: No acute abnormalities       IMPRESSION:      ICD-10-CM ICD-9-CM    1. Adhesive capsulitis of left shoulder  M75.02 726.0 REFERRAL TO PHYSICAL THERAPY      triamcinolone acetonide (KENALOG-40) 40 mg/mL injection 40 mg      ARTHROCENTESIS ASPIR&/INJ MAJOR JT/BURSA W/US        PLAN:   1. Pt presents today with left shoulder pain due to adhesive capsulitis. She is complaining of worsening night pain and I would like to try a cortisone injection today to hopefully alleviate her night pain. Continue with PT. Risk factors include: dm  2. No ultrasound exam indicated today  3. Yes cortisone injection indicated today L GLENOHUMERAL JOINT US  4. Yes Physical/Occupational Therapy indicated today  5. No diagnostic test indicated today:   6. No durable medical equipment indicated today  7. No referral indicated today   8. No medications indicated today:   9. No Narcotic indicated today      RTC 3-4 weeks      Scribed by Jovanna Hilario) as dictated by Keon Dickerson MD    I, Dr. Keon Dickerson, confirm that all documentation is accurate.     Keon Dickerson M.D.   [facility]

## 2021-05-17 ENCOUNTER — HOSPITAL ENCOUNTER (OUTPATIENT)
Dept: PHYSICAL THERAPY | Age: 51
Discharge: HOME | End: 2021-05-17
Payer: COMMERCIAL

## 2021-05-17 PROCEDURE — 97110 THERAPEUTIC EXERCISES: CPT

## 2021-05-17 PROCEDURE — 97140 MANUAL THERAPY 1/> REGIONS: CPT

## 2021-05-17 NOTE — PROGRESS NOTES
PT DAILY TREATMENT NOTE     Patient Name: Karen May  Date:2021  : 1970  [x]  Patient  Verified  Payor: BLUE CROSS / Plan: [employer] [facility] / Product Type: PPO /    In [id number]  Out [id number]:[id number]  Total Treatment Time (min): 56  Visit #: 3 of 4-6    Medicare/BCBS Only   Total Timed Codes (min):  46 1:1 Treatment Time:  46       Treatment Area: Left shoulder pain [M25.512]    SUBJECTIVE  Pain Level (0-10 scale): 4  Any medication changes, allergies to medications, adverse drug reactions, diagnosis change, or new procedure performed?: [x] No    [] Yes (see summary sheet for update)  Subjective functional status/changes:   [] No changes reported  The pt reports she received a cortisone injection from MD but has not noticed any difference.      OBJECTIVE    Modality rationale: decrease inflammation and decrease pain to improve the patients ability to perform ADL   Min Type Additional Details    [] Estim:  []Unatt       []IFC  []Premod                        []Other:  []w/ice   []w/heat  Position:  Location:    [] Estim: []Att    []TENS instruct  []NMES                    []Other:  []w/US   []w/ice   []w/heat  Position:  Location:    []  Traction: [] Cervical       []Lumbar                       [] Prone          []Supine                       []Intermittent   []Continuous Lbs:  [] before manual  [] after manual    []  Ultrasound: []Continuous   [] Pulsed                           []1MHz   []3MHz W/cm2:  Location:    []  Iontophoresis with dexamethasone         Location: [] Take home patch   [] In clinic    []  Ice     []  heat  []  Ice massage  []  Laser   []  Anodyne Position:  Location:    []  Laser with stim  []  Other:  Position:  Location:   10 [x]  Vasopneumatic Device Pressure:       [x] lo [] med [] hi   Temperature: [x] lo [] med [] hi   [] Skin assessment post-treatment:  []intact []redness- no adverse reaction    []redness  adverse reaction:     36 min Therapeutic Exercise:  [x] See flow sheet :   Rationale: increase ROM and increase strength to improve the patients ability to perform ADL      10 min Manual Therapy:  Pt right sidelying- STJ mobs/clocks. Pt supine- Grade III. IV post and inferior glides, PROM all planes   The manual therapy interventions were performed at a separate and distinct time from the therapeutic activities interventions. Rationale: decrease pain, increase ROM and increase tissue extensibility to perform ADL            With   [] TE   [] TA   [] neuro   [] other: Patient Education: [x] Review HEP    [] Progressed/Changed HEP based on:   [] positioning   [] body mechanics   [] transfers   [] heat/ice application    [] other:      Other Objective/Functional Measures:  Decreased tolerance to therex     Pain Level (0-10 scale) post treatment: 3    ASSESSMENT/Changes in Function: The pt is showing signs of freezing phase of adhesive capsulitis with increasing pain and decreased ROM. Patient will continue to benefit from skilled PT services to modify and progress therapeutic interventions, address functional mobility deficits, address ROM deficits, address strength deficits, analyze and address soft tissue restrictions and analyze and cue movement patterns to attain remaining goals. []  See Plan of Care  []  See progress note/recertification  []  See Discharge Summary         Progress towards goals / Updated goals:  Updated Goals: to be achieved in 2-3 weeks:                    1. Improve FOTO to 74 for improved ability for daily tasks               PN- 58               2. Improve left shoulder AROM flexion to 155 degrees to improve ability for daily tasks               PN- 150 degrees              Current: 145 degrees on 5-17-21               3. Improve left shoulder AROM ABD to 140 degrees for improved ability for ADL               PN- 133 degrees              Current: 133 degrees on 5-6-21               4.  The pt will report no difficulty with reaching a overhead shelf               PN- some difficulty     PLAN  []  Upgrade activities as tolerated     [x]  Continue plan of care  []  Update interventions per flow sheet       []  Discharge due to:_  []  Other:_      Kenna Ram, PT 5/17/2021  7:54 AM    Future Appointments   Date Time Provider Alondra Mayo   6/8/2021  1:50 PM Wei Dean MD [facility]

## 2021-05-20 ENCOUNTER — HOSPITAL ENCOUNTER (OUTPATIENT)
Dept: PHYSICAL THERAPY | Age: 51
Discharge: HOME | End: 2021-05-20
Payer: COMMERCIAL

## 2021-05-20 PROCEDURE — 97110 THERAPEUTIC EXERCISES: CPT

## 2021-05-20 PROCEDURE — 97140 MANUAL THERAPY 1/> REGIONS: CPT

## 2021-05-20 NOTE — PROGRESS NOTES
PT DAILY TREATMENT NOTE     Patient Name: Tim Escalera  Date:2021  : 1970  [x]  Patient  Verified  Payor: BLUE CROSS / Plan: [employer] [facility] / Product Type: PPO /    In time:4:00  Out time:4:55  Total Treatment Time (min): 55  Visit #: 4 of 4-6    Medicare/BCBS Only   Total Timed Codes (min):  45 1:1 Treatment Time:  41       Treatment Area: Left shoulder pain [M25.512]    SUBJECTIVE  Pain Level (0-10 scale): 4-5  Any medication changes, allergies to medications, adverse drug reactions, diagnosis change, or new procedure performed?: [x] No    [] Yes (see summary sheet for update)  Subjective functional status/changes:   [] No changes reported  Pt reports no changes since last visit.     OBJECTIVE    Modality rationale: decrease inflammation and decrease pain to improve the patients ability to perform ADL's with ease   Min Type Additional Details    [] Estim:  []Unatt       []IFC  []Premod                        []Other:  []w/ice   []w/heat  Position:  Location:    [] Estim: []Att    []TENS instruct  []NMES                    []Other:  []w/US   []w/ice   []w/heat  Position:  Location:    []  Traction: [] Cervical       []Lumbar                       [] Prone          []Supine                       []Intermittent   []Continuous Lbs:  [] before manual  [] after manual    []  Ultrasound: []Continuous   [] Pulsed                           []1MHz   []3MHz W/cm2:  Location:    []  Iontophoresis with dexamethasone         Location: [] Take home patch   [] In clinic    []  Ice     []  heat  []  Ice massage  []  Laser   []  Anodyne Position:  Location:    []  Laser with stim  []  Other:  Position:  Location:   10 [x]  Vasopneumatic Device Pressure:       [x] lo [] med [] hi   Temperature: [x] lo [] med [] hi   [x] Skin assessment post-treatment:  [x]intact []redness- no adverse reaction    []redness  adverse reaction:         35 min Therapeutic Exercise:  [x] See flow sheet :   Rationale: increase ROM and increase strength to improve the patients ability to perform functional task with ease. 10 min Manual Therapy:  Pt right sidelying- STJ mobs/clocks. Pt supine- Grade III. IV post and inferior glides, PROM all planes   The manual therapy interventions were performed at a separate and distinct time from the therapeutic activities interventions. Rationale: decrease pain, increase ROM, increase tissue extensibility and decrease trigger points to improve             With   [] TE   [] TA   [] neuro   [] other: Patient Education: [x] Review HEP    [] Progressed/Changed HEP based on:   [] positioning   [] body mechanics   [] transfers   [] heat/ice application    [] other:      Other Objective/Functional Measures:      Pain Level (0-10 scale) post treatment: 4-5    ASSESSMENT/Changes in Function:   Pt continues to display limited mobility in the left shoulder noted throughout therex due to increasing pain. Pt challenged with Sidelying exercises displaying the inability to perform 20 reps of s/l ABD as prescribed due to pain in the left shoulder. Pt able to perform 10 reps. Increased reps to some exercises (wall slides, standing cane series, full cans and T band row/Ext) with no significant increases in pain. Continued treatment to improve mobility and pain levels of the left shoulder to aid with ease of ADL's. Patient will continue to benefit from skilled PT services to modify and progress therapeutic interventions, address functional mobility deficits, address ROM deficits, address strength deficits, analyze and address soft tissue restrictions, analyze and cue movement patterns, analyze and modify body mechanics/ergonomics and assess and modify postural abnormalities to attain remaining goals.      [x]  See Plan of Care  []  See progress note/recertification  []  See Discharge Summary         Progress towards goals / Updated goals:  Updated Goals: to be achieved in 2-3 weeks:                    1. Improve FOTO to 74 for improved ability for daily tasks               PN- 58               2. Improve left shoulder AROM flexion to 155 degrees to improve ability for daily tasks               PN- 150 degrees              Current: 145 degrees on 5-17-21               3. Improve left shoulder AROM ABD to 140 degrees for improved ability for ADL               PN- 133 degrees              Current: 133 degrees on 5-6-21               4.  The pt will report no difficulty with reaching a overhead shelf               PN- some difficulty    Current: no change 5/20/21 continued difficulty    PLAN  []  Upgrade activities as tolerated     [x]  Continue plan of care  []  Update interventions per flow sheet       []  Discharge due to:_  []  Other:_      Eyal Munoz, PTA 5/20/2021  4:13 PM    Future Appointments   Date Time Provider Alondra Mayo   5/24/2021  6:00 PM Beni Lopez, PTA [facility]   5/27/2021  7:00 AM Rosa Philip, PTA [facility]   6/2/2021 10:45 AM Peola Bourdon, PT [facility]   6/4/2021  7:00 AM Peola Bourdon, PT [facility]   6/7/2021  7:00 AM Peola Bourdon, PT [facility]   6/8/2021  1:50 PM Citlali Sandy MD [facility]

## 2021-05-24 ENCOUNTER — APPOINTMENT (OUTPATIENT)
Dept: PHYSICAL THERAPY | Age: 51
End: 2021-05-24
Payer: COMMERCIAL

## 2021-05-27 ENCOUNTER — HOSPITAL ENCOUNTER (OUTPATIENT)
Dept: PHYSICAL THERAPY | Age: 51
Discharge: HOME | End: 2021-05-27
Payer: COMMERCIAL

## 2021-05-27 PROCEDURE — 97140 MANUAL THERAPY 1/> REGIONS: CPT

## 2021-05-27 PROCEDURE — 97110 THERAPEUTIC EXERCISES: CPT

## 2021-05-27 NOTE — PROGRESS NOTES
PT DAILY TREATMENT NOTE     Patient Name: Elio Macias  Date:2021  : 1970  [x]  Patient  Verified  Payor: BLUE CROSS / Plan: [facility] / Product Type: PPO /    In time:7:00  Out time:7:54  Total Treatment Time (min): 54  Visit #: 5 of 4-6    Medicare/BCBS Only   Total Timed Codes (min):  44 1:1 Treatment Time:  44       Treatment Area: Left shoulder pain [M25.512]    SUBJECTIVE  Pain Level (0-10 scale): 310  Any medication changes, allergies to medications, adverse drug reactions, diagnosis change, or new procedure performed?: [x] No    [] Yes (see summary sheet for update)  Subjective functional status/changes:   [] No changes reported  \"I think the steroid injection is helping. \"    OBJECTIVE    Modality rationale: decrease inflammation and decrease pain to improve the patients ability to perform ADL's.    Min Type Additional Details    [] Estim:  []Unatt       []IFC  []Premod                        []Other:  []w/ice   []w/heat  Position:  Location:    [] Estim: []Att    []TENS instruct  []NMES                    []Other:  []w/US   []w/ice   []w/heat  Position:  Location:    []  Traction: [] Cervical       []Lumbar                       [] Prone          []Supine                       []Intermittent   []Continuous Lbs:  [] before manual  [] after manual    []  Ultrasound: []Continuous   [] Pulsed                           []1MHz   []3MHz W/cm2:  Location:    []  Iontophoresis with dexamethasone         Location: [] Take home patch   [] In clinic    []  Ice     []  heat  []  Ice massage  []  Laser   []  Anodyne Position:  Location:    []  Laser with stim  []  Other:  Position:  Location:   10 [x]  Vasopneumatic Device Pressure:       [x] lo [] med [] hi   Temperature: [x] lo [] med [] hi   [] Skin assessment post-treatment:  []intact []redness- no adverse reaction    []redness  adverse reaction:     34 min Therapeutic Exercise:  [x] See flow sheet :   Rationale: increase ROM and increase strength to improve the patients ability to perform ADL's. 10 min Manual Therapy:  DTM/TPR Left UT, lev scap, infraspinatus. Left ST joint clocks grade III. Pt right side-lying. Left GH joint inf/post mobs grade III. Shoulder PROM all planes. Post-capsule stretch. Pt supine. The manual therapy interventions were performed at a separate and distinct time from the therapeutic activities interventions. Rationale: decrease pain, increase ROM, increase tissue extensibility and decrease trigger points to improve ease of performing OH activities. With   [x] TE   [] TA   [] neuro   [] other: Patient Education: [x] Review HEP    [] Progressed/Changed HEP based on:   [] positioning   [] body mechanics   [] transfers   [] heat/ice application    [] other:      Other Objective/Functional Measures: No change in there ex. Pain Level (0-10 scale) post treatment: 1/10    ASSESSMENT/Changes in Function: Occasional sharp stabbing pain in Left shoulder with exercises, pt reports having this pain several times a day however frequency has decreased in the last week or so. Pt reported a decrease in tension/pain post-treatment. Continue PT to further increase shoulder mobility/strength to improve ease of performing OH activities. Patient will continue to benefit from skilled PT services to modify and progress therapeutic interventions, address functional mobility deficits, address ROM deficits, address strength deficits, analyze and address soft tissue restrictions and analyze and modify body mechanics/ergonomics to attain remaining goals. [x]  See Plan of Care  []  See progress note/recertification  []  See Discharge Summary         Progress towards goals / Updated goals:  Updated Goals: to be achieved in 2-3 weeks:                    1. Improve FOTO to 74 for improved ability for daily tasks               PN- 58               2.  Improve left shoulder AROM flexion to 155 degrees to improve ability for daily tasks               PN- 150 degrees              Current: 145 degrees on 5-17-21               3. Improve left shoulder AROM ABD to 140 degrees for improved ability for ADL               PN- 133 degrees              Current: 133 degrees on 5-6-21               4.  The pt will report no difficulty with reaching a overhead shelf               PN- some difficulty               Current: no change 5/20/21 continued difficulty    PLAN  []  Upgrade activities as tolerated     [x]  Continue plan of care  []  Update interventions per flow sheet       []  Discharge due to:_  []  Other:_      Demarcus Oviedo, PTA 5/27/2021  7:03 AM    Future Appointments   Date Time Provider Alondra Mayo   6/2/2021 10:45 AM Lyndsay Chauhan, PT [facility]   6/4/2021  7:00 AM Lyndsay Chauhan, PT [facility]   6/7/2021  7:00 AM Lyndsay Chauhan, PT [facility]   6/8/2021  1:50 PM Neville Diaz MD [facility] AMB

## 2021-06-02 ENCOUNTER — APPOINTMENT (OUTPATIENT)
Dept: PHYSICAL THERAPY | Age: 51
End: 2021-06-02
Payer: COMMERCIAL

## 2021-06-03 ENCOUNTER — APPOINTMENT (OUTPATIENT)
Dept: PHYSICAL THERAPY | Age: 51
End: 2021-06-03
Payer: COMMERCIAL

## 2021-06-04 ENCOUNTER — APPOINTMENT (OUTPATIENT)
Dept: PHYSICAL THERAPY | Age: 51
End: 2021-06-04
Payer: COMMERCIAL

## 2021-06-07 ENCOUNTER — APPOINTMENT (OUTPATIENT)
Dept: PHYSICAL THERAPY | Age: 51
End: 2021-06-07
Payer: COMMERCIAL

## 2021-06-08 ENCOUNTER — OFFICE VISIT (OUTPATIENT)
Dept: ORTHOPEDIC SURGERY | Age: 51
End: 2021-06-08
Payer: COMMERCIAL

## 2021-06-08 VITALS
HEART RATE: 82 BPM | BODY MASS INDEX: 23.49 KG/M2 | RESPIRATION RATE: 15 BRPM | OXYGEN SATURATION: 90 % | HEIGHT: 65 IN | WEIGHT: 141 LBS

## 2021-06-08 DIAGNOSIS — M75.02 ADHESIVE CAPSULITIS OF LEFT SHOULDER: Primary | ICD-10-CM

## 2021-06-08 PROCEDURE — 99213 OFFICE O/P EST LOW 20 MIN: CPT | Performed by: ORTHOPAEDIC SURGERY

## 2021-06-08 NOTE — PROGRESS NOTES
Abdon Machuca  1970   Chief Complaint   Patient presents with    Shoulder Pain     left shoulder        HISTORY OF PRESENT ILLNESS  Abdon Machuca is a 45 y.o. female who presents today for reevaluation of left shoulder. Patient rates pain as 4/10 today. Pain has been present for over 4 months. Has hx of right frozen shoulder. Tried PT for 6 months and had two shoulder manipulations of her right shoulder. Describes a burning pain in the left shoulder. At last OV on 5/11/2021, patient had a cortisone injection in the left glenohumeral joint which provided minimal relief. Has been going to PT for the left shoulder. Still having issues with the shoulder. Has also been trying massage and going to chiropractor for shoulder and neck. Still having limited ROM, especially with reaching behind. She does note hx of a meningioma, states she has scar tissue in her brain from this. Patient denies any fever, chills, chest pain, shortness of breath or calf pain. The remainder of the review of systems is negative. There are no new illness or injuries to report since last seen in the office. There are no changes to medications, allergies, family or social history. Pain Assessment  6/8/2021   Location of Pain Shoulder   Location Modifiers Left   Severity of Pain 4   Quality of Pain Aching   Quality of Pain Comment -   Duration of Pain Persistent   Frequency of Pain Constant   Aggravating Factors Bending;Stretching;Straightening   Aggravating Factors Comment -   Limiting Behavior Yes   Relieving Factors Rest   Relieving Factors Comment -   Result of Injury -       PHYSICAL EXAM:   Visit Vitals  Pulse 82   Resp 15   Ht 5' 5\" (1.651 m)   Wt 141 lb (64 kg)   SpO2 90%   BMI 23.46 kg/m²     The patient is a well-developed, well-nourished female   in no acute distress. The patient is alert and oriented times three. The patient is alert and oriented times three.  Mood and affect are normal.  LYMPHATIC: lymph nodes are not enlarged and are within normal limits  SKIN: normal in color and non tender to palpation. There are no bruises or abrasions noted. NEUROLOGICAL: Motor sensory exam is within normal limits. Reflexes are equal bilaterally. There is normal sensation to pinprick and light touch  MUSCULOSKELETAL:  Examination Left shoulder   Skin Intact   AC joint tenderness -   Biceps tenderness -   Forward flexion/Elevation    Active abduction    Glenohumeral abduction 60   External rotation ROM 45   Internal rotation ROM 30   Apprehension -   Tacos Relocation -   Jerk -   Load and Shift -   Obriens -   Speeds -   Impingement sign +   Supraspinatus/Empty Can -, 5/5   External Rotation Strength -, 5/5   Lift Off/Belly Press -, 5/5   Neurovascular Intact        IMAGING: XR of left shoulder with 3 views obtained in the office dated 3/16/2021 was reviewed and read by Dr. Royal Blanco: No acute abnormalities       IMPRESSION:      ICD-10-CM ICD-9-CM    1. Adhesive capsulitis of left shoulder  M75.02 726.0 MRI SHOULDER LT WO CONT        PLAN:   1. Pt presents today with persistent left shoulder pain despite the cortisone injection and PT. Will be ordering MRI to r/o a RCT. Risk factors include: dm  2. No ultrasound exam indicated today  3. No cortisone injection indicated today  4. No Physical/Occupational Therapy indicated today  5. Yes diagnostic test indicated today: MRI L SHOULDER  6. No durable medical equipment indicated today  7. No referral indicated today   8. No medications indicated today:   9. No Narcotic indicated today      RTC following MRI      Scribed by Felicitas Oro [address] [place] [address] [address]) as dictated by Gladys Perkins MD    I, Dr. Gladys Perkins, confirm that all documentation is accurate.     Gladys Perkins M.D.   [facility]

## 2021-06-17 ENCOUNTER — HOSPITAL ENCOUNTER (OUTPATIENT)
Dept: PHYSICAL THERAPY | Age: 51
Discharge: HOME | End: 2021-06-17
Payer: COMMERCIAL

## 2021-06-17 PROCEDURE — 97110 THERAPEUTIC EXERCISES: CPT

## 2021-06-17 PROCEDURE — 97016 VASOPNEUMATIC DEVICE THERAPY: CPT

## 2021-06-17 PROCEDURE — 97140 MANUAL THERAPY 1/> REGIONS: CPT

## 2021-06-17 NOTE — PROGRESS NOTES
PT DAILY TREATMENT NOTE     Patient Name: Beth Phelan  Date:2021  : 1970  [x]  Patient  Verified  Payor: BLUE CROSS / Plan: [address] [place] [address] [place] / Product Type: PPO /    In time:2:35  Out time:3:28  Total Treatment Time (min): 53  Visit #: 6 of 4-6    Medicare/BCBS Only   Total Timed Codes (min):  43 1:1 Treatment Time:  43       Treatment Area: Left shoulder pain [M25.512]    SUBJECTIVE  Pain Level (0-10 scale): 3-4  Any medication changes, allergies to medications, adverse drug reactions, diagnosis change, or new procedure performed?: [x] No    [] Yes (see summary sheet for update)  Subjective functional status/changes:   [] No changes reported  Pt reports doing her exercises every day and has been getting regular therapeutic massages    OBJECTIVE    Modality rationale: decrease pain to improve the patients ability to perform daily task   Min Type Additional Details    [] Estim:  []Unatt       []IFC  []Premod                        []Other:  []w/ice   []w/heat  Position:  Location:    [] Estim: []Att    []TENS instruct  []NMES                    []Other:  []w/US   []w/ice   []w/heat  Position:  Location:    []  Traction: [] Cervical       []Lumbar                       [] Prone          []Supine                       []Intermittent   []Continuous Lbs:  [] before manual  [] after manual    []  Ultrasound: []Continuous   [] Pulsed                           []1MHz   []3MHz W/cm2:  Location:    []  Iontophoresis with dexamethasone         Location: [] Take home patch   [] In clinic    []  Ice     []  heat  []  Ice massage  []  Laser   []  Anodyne Position:  Location:    []  Laser with stim  []  Other:  Position:  Location:   10 [x]  Vasopneumatic Device    []  Right     [x]  Left  Pre-treatment girth:  Post-treatment girth:  Measured at (location):  Pressure:       [x] lo [] med [] hi   Temperature: [x] lo [] med [] hi   [] Skin assessment post-treatment:  []intact []redness- no adverse reaction    []redness  adverse reaction:       33 min Therapeutic Exercise:  [x] See flow sheet :   Rationale: increase ROM and increase strength to improve the patients ability to perform ADLs    10 min Manual Therapy:   DTM/TPR Left UT, lev scap, infraspinatus. Left ST joint clocks grade III. Pt right side-lying. Left GH joint inf/post mobs grade III. Shoulder PROM all planes. Post-capsule stretch. Pt supine. The manual therapy interventions were performed at a separate and distinct time from the therapeutic activities interventions. Rationale: decrease pain, increase ROM and increase tissue extensibility to improve functional mobility             With   [] TE   [] TA   [] neuro   [] other: Patient Education: [x] Review HEP    [] Progressed/Changed HEP based on:   [] positioning   [] body mechanics   [] transfers   [] heat/ice application    [] other:      Other Objective/Functional Measures:   FOTO 44  Left shoulder AROM abd 65*     Pain Level (0-10 scale) post treatment: 2    ASSESSMENT/Changes in Function: Pt has made some progress at the beginning of PT but lately has experienced incr'd capsular tightness, limited ROM, and incr'd pain. Pt is fully compliance with HEP and also gets regular therapeutic massages. Pt is scheduled for MRI on 6/23/2021. Discussed cont'd PT for 4 weeks with focus on improving shoulder ROM and strength to improve ease with daily tasks. Patient will continue to benefit from skilled PT services to modify and progress therapeutic interventions, address functional mobility deficits, address ROM deficits, address strength deficits, analyze and address soft tissue restrictions, analyze and cue movement patterns, analyze and modify body mechanics/ergonomics and assess and modify postural abnormalities to attain remaining goals.      []  See Plan of Care  []  See progress note/recertification  []  See Discharge Summary         Progress towards goals / Updated goals:  Updated Goals: to be achieved in 2-3 weeks:               1. Improve FOTO to 74 for improved ability for daily tasks               PN- 58   Current: FOTO 55 6/17/2021               2. Improve left shoulder AROM flexion to 155 degrees to improve ability for daily tasks               PN- 150 degrees              Current: 145 degrees on 5-17-21               3. Improve left shoulder AROM ABD to 140 degrees for improved ability for ADL               PN- 133 degrees              Current: 65 degrees on 5-17-21               4.  The pt will report no difficulty with reaching a overhead shelf               PN- some difficulty               [id number]: some difficulty    PLAN  []  Upgrade activities as tolerated     [x]  Continue plan of care  []  Update interventions per flow sheet       []  Discharge due to:_  []  Other:_      Parminder Flowers, RAMBO 6/17/2021  2:34 PM  \    Future Appointments   Date Time Provider Alondra Mayo   6/23/2021  7:30 AM [facility] MRI RM 1 [facility]   6/24/2021  7:00 AM Ayesha Shah, PT [facility]   6/28/2021  7:45 AM Yonatan Keating, PT [facility]

## 2021-06-17 NOTE — PROGRESS NOTES
[facility]  [address]  [place], [address]  [phone] fax    Physical Therapy Progress Note  Patient name: Jose Montes Start of Care: 3/18/2021   Referral source: Dang Owens,* : 1970   Medical/Treatment Diagnosis: Left shoulder pain [M25.512]  Payor: [employer] / Plan: [facility] / Product Type: PPO /  Onset Date:2021          Prior Hospitalization: see medical history Provider#: [id number]   Medications: Verified on Patient Summary List    Comorbidities: diabetes, right frozen shoulder, brain surgery  Prior Level of Function:functionally I with all activities  Visits from Start of Care: 10    Missed Visits: 1    Key Functional Changes: Pt has made some progress at the beginning of PT but lately has experienced incr'd capsular tightness, limited ROM, and incr'd pain. Pt is fully compliance with HEP and also gets regular therapeutic massages. Pt is scheduled for MRI on 2021. Discussed cont'd PT for 4 weeks with focus on improving shoulder ROM and strength to improve ease with daily tasks. Progress towards goals:    1. Improve FOTO to 74 for improved ability for daily tasks               PN- 58              Current: FOTO 55 2021               2. Improve left shoulder AROM flexion to 155 degrees to improve ability for daily tasks               PN- 150 degrees              Current: 145 degrees on 21               3. Improve left shoulder AROM ABD to 140 degrees for improved ability for ADL               PN- 133 degrees              Current: 65 degrees on 21               4.  The pt will report no difficulty with reaching a overhead shelf               PN- some difficulty               [id number]: some difficulty      Updated Goals: to be achieved in 4 weeks: Continue working towards unmet goals    ASSESSMENT/RECOMMENDATIONS:  [x]Continue therapy per initial plan/protocol at a frequency of  2 x per week for 4 weeks  []Continue therapy with the following recommended changes:_____________________      _____________________________________________________________________  []Discontinue therapy progressing towards or have reached established goals  []Discontinue therapy due to lack of appreciable progress towards goals  []Discontinue therapy due to lack of attendance or compliance  []Await Physician's recommendations/decisions regarding therapy  []Other:________________________________________________________________    Thank you for this referral.    Robbie Flowers, PTA 6/17/2021 3:41 PM  NOTE TO PHYSICIAN:  PLEASE COMPLETE THE ORDERS BELOW AND   FAX TO [facility]: [phone]  If you are unable to process this request in 24 hours please contact our office: [phone]    ? I have read the above report and request that my patient continue as recommended. ? I have read the above report and request that my patient continue therapy with the following changes/special instructions:__________________________________________________________  ? I have read the above report and request that my patient be discharged from therapy.     Physicians signature: ______________________________Date: ______Time:______     Jeniffer Crowley,*

## 2021-06-22 ENCOUNTER — HOSPITAL ENCOUNTER (OUTPATIENT)
Dept: PHYSICAL THERAPY | Age: 51
Discharge: HOME | End: 2021-06-22
Payer: COMMERCIAL

## 2021-06-22 PROCEDURE — 97110 THERAPEUTIC EXERCISES: CPT

## 2021-06-22 PROCEDURE — 97140 MANUAL THERAPY 1/> REGIONS: CPT

## 2021-06-22 NOTE — PROGRESS NOTES
PT DAILY TREATMENT NOTE     Patient Name: Bharathi Shields  Date:2021  : 1970  [x]  Patient  Verified  Payor: BLUE CROSS / Plan: [employer] [facility] [place] / Product Type: PPO /    In time:2:14  Out time:3:05  Total Treatment Time (min): 51  Visit #: 1 of 8    Medicare/BCBS Only   Total Timed Codes (min):  41 1:1 Treatment Time:  41       Treatment Area: Left shoulder pain [M25.512]    SUBJECTIVE  Pain Level (0-10 scale): 2/10  Any medication changes, allergies to medications, adverse drug reactions, diagnosis change, or new procedure performed?: [x] No    [] Yes (see summary sheet for update)  Subjective functional status/changes:   [] No changes reported  The patient states that her pain is a bit better today, but still continuing to work on her range of motion. OBJECTIVE  Modality rationale: decrease edema, decrease inflammation and decrease pain to improve the patients ability to improve ADL ease. Min Type Additional Details   10 [x]  Vasopneumatic Device    []  Right     [x]  Left  Pre-treatment girth:  Post-treatment girth:  Measured at (location):  Pressure:       [x] lo [] med [] hi   Temperature: [x] lo [] med [] hi   [] Skin assessment post-treatment:  []intact []redness- no adverse reaction    []redness  adverse reaction:       31 min Therapeutic Exercise:  [x] See flow sheet :   Rationale: increase ROM and increase strength to improve the patients ability to improve ADL ease. 10 min Manual Therapy:  Left GHJ inferior/posterior capsular mobs performed with the patient in supine through all ranges of  flexion/ABD/ER/IR. The manual therapy interventions were performed at a separate and distinct time from the therapeutic activities interventions. Rationale: decrease pain, increase ROM and increase tissue extensibility to improve ADL ease.       With   [] TE   [] TA   [] neuro   [] other: Patient Education: [x] Review HEP    [] Progressed/Changed HEP based on:   [] positioning [] body mechanics   [] transfers   [] heat/ice application    [] other:      Other Objective/Functional Measures:   PROM left shoulder  Flexion: 140 degrees  ABD: 115 degrees  ER 90/90: 50 degrees      Pain Level (0-10 scale) post treatment: 1/10    ASSESSMENT/Changes in Function: The patient is quite limited by pain and requires multiple breaks throughout manual joint mobs, as she is limited by pain. She declines doorway stretch. Her passive mobility appears to have regressed as compared to last session. Patient will continue to benefit from skilled PT services to modify and progress therapeutic interventions, address functional mobility deficits, address ROM deficits, address strength deficits, analyze and address soft tissue restrictions, analyze and cue movement patterns, analyze and modify body mechanics/ergonomics, assess and modify postural abnormalities and instruct in home and community integration to attain remaining goals. []  See Plan of Care  []  See progress note/recertification  []  See Discharge Summary         Progress towards goals / Updated goals:    1. Improve FOTO to 74 for improved ability for daily tasks               PN- 58              Current: FOTO 55 6/17/2021               2. Improve left shoulder AROM flexion to 155 degrees to improve ability for daily tasks               PN- 150 degrees              Current: Regressed to 140 degrees PROM flexion 6/22/2021               3. Improve left shoulder AROM ABD to 140 degrees for improved ability for ADL               PN- 133 degrees              Current: 65 degrees on 5-17-21               4.  The pt will report no difficulty with reaching a overhead shelf               PN- some difficulty                Current: some difficulty    PLAN  []  Upgrade activities as tolerated     [x]  Continue plan of care  []  Update interventions per flow sheet       []  Discharge due to:_  []  Other:_      Boni Wilhelm, PT 6/22/2021  2:18 PM    Future Appointments   Date Time Provider Alondra Maria Esther   6/23/2021  7:30 AM [facility] RM 1 [facility]   6/24/2021  7:00 AM Chandler Cuna, PT [facility]   6/28/2021  7:45 AM Chandler Cuna, PT [facility]   7/1/2021  1:00 PM Chandler Cuna, PT [facility]   7/6/2021  2:30 PM Chandler Cuna, PT [facility]   7/8/2021  7:45 AM Hunter Bolaños, PT [facility]

## 2021-06-23 ENCOUNTER — HOSPITAL ENCOUNTER (OUTPATIENT)
Age: 51
Discharge: HOME | End: 2021-06-23
Attending: ORTHOPAEDIC SURGERY
Payer: COMMERCIAL

## 2021-06-23 DIAGNOSIS — M75.02 ADHESIVE CAPSULITIS OF LEFT SHOULDER: ICD-10-CM

## 2021-06-23 PROCEDURE — 73221 MRI JOINT UPR EXTREM W/O DYE: CPT

## 2021-06-24 ENCOUNTER — HOSPITAL ENCOUNTER (OUTPATIENT)
Dept: PHYSICAL THERAPY | Age: 51
Discharge: HOME | End: 2021-06-24
Payer: COMMERCIAL

## 2021-06-24 PROCEDURE — 97140 MANUAL THERAPY 1/> REGIONS: CPT

## 2021-06-24 PROCEDURE — 97110 THERAPEUTIC EXERCISES: CPT

## 2021-06-24 NOTE — PROGRESS NOTES
PT DAILY TREATMENT NOTE     Patient Name: Magali Ashton  Date:2021  : 1970  [x]  Patient  Verified  Payor: BLUE CROSS / Plan: [employer] / Product Type: PPO /    In time:702  Out time:755  Total Treatment Time (min): 53  Visit #: 2 of 8    Medicare/BCBS Only   Total Timed Codes (min):  43 1:1 Treatment Time:  43       Treatment Area: Left shoulder pain [M25.512]    SUBJECTIVE  Pain Level (0-10 scale):  2  Any medication changes, allergies to medications, adverse drug reactions, diagnosis change, or new procedure performed?: [x] No    [] Yes (see summary sheet for update)  Subjective functional status/changes:   [] No changes reported  The pt reports she tried to use a little heat    OBJECTIVE    Modality rationale: decrease inflammation and decrease pain to improve the patients ability to perform ADL   Min Type Additional Details    [] Estim:  []Unatt       []IFC  []Premod                        []Other:  []w/ice   []w/heat  Position:  Location:    [] Estim: []Att    []TENS instruct  []NMES                    []Other:  []w/US   []w/ice   []w/heat  Position:  Location:    []  Traction: [] Cervical       []Lumbar                       [] Prone          []Supine                       []Intermittent   []Continuous Lbs:  [] before manual  [] after manual    []  Ultrasound: []Continuous   [] Pulsed                           []1MHz   []3MHz W/cm2:  Location:    []  Iontophoresis with dexamethasone         Location: [] Take home patch   [] In clinic    []  Ice     []  heat  []  Ice massage  []  Laser   []  Anodyne Position:  Location:    []  Laser with stim  []  Other:  Position:  Location:   10 [x]  Vasopneumatic Device    []  Right     []  Left  Pre-treatment girth:  Post-treatment girth:  Measured at (location):  Pressure:       [x] lo [] med [] hi   Temperature: [x] lo [] med [] hi   [] Skin assessment post-treatment:  []intact []redness- no adverse reaction    []redness  adverse reaction:     27 min Therapeutic Exercise:  [x] See flow sheet :   Rationale: increase ROM and increase strength to improve the patients ability to perform ADL      15 min Manual Therapy:  GHJ mobs grade II/III with pt supine, PROM to left shoulder all planes   The manual therapy interventions were performed at a separate and distinct time from the therapeutic activities interventions. Rationale: decrease pain, increase ROM and increase tissue extensibility to perform ADL              With   [] TE   [] TA   [] neuro   [] other: Patient Education: [x] Review HEP    [] Progressed/Changed HEP based on:   [] positioning   [] body mechanics   [] transfers   [] heat/ice application    [] other:      Other Objective/Functional Measures: added table slide     Pain Level (0-10 scale) post treatment: 1    ASSESSMENT/Changes in Function: The pt with pain increase with wall slides today. Added table slide with better tolerance. Patient will continue to benefit from skilled PT services to modify and progress therapeutic interventions, address functional mobility deficits, address ROM deficits, address strength deficits, analyze and address soft tissue restrictions and analyze and cue movement patterns to attain remaining goals. []  See Plan of Care  []  See progress note/recertification  []  See Discharge Summary         Progress towards goals / Updated goals:  1. Improve FOTO to 74 for improved ability for daily tasks               PN- 58              Current: FOTO 55 6/17/2021               2. Improve left shoulder AROM flexion to 155 degrees to improve ability for daily tasks               PN- 150 degrees              Current: Regressed to 140 degrees PROM flexion 6/22/2021               3. Improve left shoulder AROM ABD to 140 degrees for improved ability for ADL               PN- 133 degrees              Current: 65 degrees on 5-17-21               4.  The pt will report no difficulty with reaching a overhead shelf               PN- some difficulty                Current: some difficulty on 6-24-21    PLAN  []  Upgrade activities as tolerated     [x]  Continue plan of care  []  Update interventions per flow sheet       []  Discharge due to:_  []  Other:_      Darcie Segundo, PT 6/24/2021  7:16 AM    Future Appointments   Date Time Provider Alondra Mayo   6/28/2021  7:45 AM Sara Cancer, PT [facility]   7/1/2021  1:00 PM [facility]   7/6/2021  2:30 PM [facility]   7/8/2021  7:45 AM [facility]

## 2021-07-01 ENCOUNTER — APPOINTMENT (OUTPATIENT)
Dept: PHYSICAL THERAPY | Age: 51
End: 2021-07-01
Payer: COMMERCIAL

## 2021-07-06 ENCOUNTER — APPOINTMENT (OUTPATIENT)
Dept: PHYSICAL THERAPY | Age: 51
End: 2021-07-06
Payer: COMMERCIAL

## 2021-07-08 ENCOUNTER — APPOINTMENT (OUTPATIENT)
Dept: PHYSICAL THERAPY | Age: 51
End: 2021-07-08
Payer: COMMERCIAL

## 2021-07-20 ENCOUNTER — OFFICE VISIT (OUTPATIENT)
Dept: ORTHOPEDIC SURGERY | Age: 51
End: 2021-07-20
Payer: COMMERCIAL

## 2021-07-20 VITALS
HEIGHT: 65 IN | OXYGEN SATURATION: 98 % | WEIGHT: 172 LBS | BODY MASS INDEX: 28.66 KG/M2 | HEART RATE: 93 BPM | TEMPERATURE: 97.3 F | RESPIRATION RATE: 15 BRPM

## 2021-07-20 DIAGNOSIS — M75.112 NONTRAUMATIC INCOMPLETE TEAR OF LEFT ROTATOR CUFF: Primary | ICD-10-CM

## 2021-07-20 DIAGNOSIS — M75.02 ADHESIVE CAPSULITIS OF LEFT SHOULDER: ICD-10-CM

## 2021-07-20 PROCEDURE — 99213 OFFICE O/P EST LOW 20 MIN: CPT | Performed by: ORTHOPAEDIC SURGERY

## 2021-07-20 NOTE — PROGRESS NOTES
Kyaw Parr  1970   Chief Complaint   Patient presents with    Shoulder Pain     left        HISTORY OF PRESENT ILLNESS  Kyaw Parr is a 45 y.o. female who presents today for reevaluation of left shoulder and MRI review. Patient rates pain as 2/10 today. Pain has been present for over 4 months. Has hx of right frozen shoulder. Tried PT for 6 months and had two shoulder manipulations of her right shoulder. Has been using a pulley and going to PT. On 5/11/2021, patient had a cortisone injection in the left glenohumeral joint which provided minimal relief. Notes that pain and discomfort as improved since onset. Does note hx of a meningioma, states she has scar tissue in her brain from this. Patient denies any fever, chills, chest pain, shortness of breath or calf pain. The remainder of the review of systems is negative. There are no new illness or injuries to report since last seen in the office. There are no changes to medications, allergies, family or social history. Pain Assessment  7/20/2021   Location of Pain Shoulder   Location Modifiers Left   Severity of Pain 2   Quality of Pain Aching;Dull   Quality of Pain Comment -   Duration of Pain Persistent   Frequency of Pain Constant   Aggravating Factors Bending;Stretching;Straightening;Exercise   Aggravating Factors Comment -   Limiting Behavior Yes   Relieving Factors Ice;Rest   Relieving Factors Comment -   Result of Injury No       PHYSICAL EXAM:   Visit Vitals  Pulse 93   Temp 97.3 °F (36.3 °C)   Resp 15   Ht 5' 5\" (1.651 m)   Wt 172 lb (78 kg)   SpO2 98%   BMI 28.62 kg/m²     The patient is a well-developed, well-nourished female   in no acute distress. The patient is alert and oriented times three. The patient is alert and oriented times three. Mood and affect are normal.  LYMPHATIC: lymph nodes are not enlarged and are within normal limits  SKIN: normal in color and non tender to palpation. There are no bruises or abrasions noted. NEUROLOGICAL: Motor sensory exam is within normal limits. Reflexes are equal bilaterally. There is normal sensation to pinprick and light touch  MUSCULOSKELETAL:  Examination Left shoulder   Skin Intact   AC joint tenderness -   Biceps tenderness -   Forward flexion/Elevation    Active abduction    Glenohumeral abduction 60   External rotation ROM 45   Internal rotation ROM 30   Apprehension -   Tacos Relocation -   Jerk -   Load and Shift -   Obriens -   Speeds -   Impingement sign +   Supraspinatus/Empty Can -, 5/5   External Rotation Strength -, 5/5   Lift Off/Belly Press -, 5/5   Neurovascular Intact        IMAGING:  MRI of the left shoulder dated 6/23/2021 was reviewed and read by Dr. Larsen Numbers:   IMPRESSION  Mild mild global rotator cuff tendinosis with partial thickness, near full width tear of the supraspinatus tendon involving up to 50% thickness. Mild/moderate, slightly complex glenohumeral joint effusion and  subacromial/subdeltoid bursitis. SLAP lesion without extension into the biceps anchor.   Findings suggesting some component of adhesive capsulitis.        XR of left shoulder with 3 views obtained in the office dated 3/16/2021 was reviewed and read by Dr. Larsen Numbers: No acute abnormalities       IMPRESSION:      ICD-10-CM ICD-9-CM    1. Nontraumatic incomplete tear of left rotator cuff  M75.112 726.13    2. Adhesive capsulitis of left shoulder  M75.02 726.0         PLAN:   1. Pt presents today with persistent left shoulder pain due to an MRI-documented partial left rotator cuff tear. Discussed treatment options such as surgery but pt did not feel the pain was severe enough to continue with surgery at this time. I emphasized the importance of improving shoulder ROM by continuing with stretching and exercises. Risk factors include: dm  2. No ultrasound exam indicated today  3. No cortisone injection indicated today  4. No Physical/Occupational Therapy indicated today  5.  No diagnostic test indicated today  6. No durable medical equipment indicated today  7. No referral indicated today   8. No medications indicated today:   9. No Narcotic indicated today      RTC prn      Scribed by Eliane Cruz [facility]) as dictated by MD JEREMY Cesar, Dr. Yessica Corcoran, confirm that all documentation is accurate.     Yessica Corcoran M.D.   [facility]

## 2021-07-21 ENCOUNTER — HOSPITAL ENCOUNTER (OUTPATIENT)
Dept: PHYSICAL THERAPY | Age: 51
Discharge: HOME | End: 2021-07-21
Payer: COMMERCIAL

## 2021-07-21 PROCEDURE — 97140 MANUAL THERAPY 1/> REGIONS: CPT

## 2021-07-21 PROCEDURE — 97110 THERAPEUTIC EXERCISES: CPT

## 2021-07-21 PROCEDURE — 97016 VASOPNEUMATIC DEVICE THERAPY: CPT

## 2021-07-21 NOTE — PROGRESS NOTES
PT DAILY TREATMENT NOTE     Patient Name: Lisa Sandoval  Date:2021  : 1970  [x]  Patient  Verified  Payor: BLUE CROSS / Plan: [employer] [facility] / Product Type: PPO /    In time: 8:30 AM  Out time: 9:21 AM  Total Treatment Time (min): 51  Visit #: 3 of 8    Medicare/BCBS Only   Total Timed Codes (min):  41 1:1 Treatment Time:  45       Treatment Area: Left shoulder pain [M25.512]    SUBJECTIVE  Pain Level (0-10 scale): 2  Any medication changes, allergies to medications, adverse drug reactions, diagnosis change, or new procedure performed?: [x] No    [] Yes (see summary sheet for update)  Subjective functional status/changes:   [] No changes reported  Patient reports having tried to complete HEP while away dealing with family emergency. States she had follow up with MD who diagnosed patient with rotator cuff tear. OBJECTIVE    Modality rationale: decrease edema and decrease pain to improve the patients ability to manage post exercise soreness for ADL tolerance through remainder of day. Min Type Additional Details   10 []  Vasopneumatic Device    []  Right     [x]  Left  Pre-treatment girth:  Post-treatment girth:  Measured at (location):  Pressure:       [x] lo [] med [] hi   Temperature: [x] lo [] med [] hi   [] Skin assessment post-treatment:  []intact []redness- no adverse reaction    []redness  adverse reaction:     31 min Therapeutic Exercise:  [x] See flow sheet :   Rationale: increase ROM and increase strength to improve the patients ability to complete self care tasks with greater ease. 10 min Manual Therapy:    Supine - Grade II/III GHJ mobilizations posterior/inferior, PROM    The manual therapy interventions were performed at a separate and distinct time from the therapeutic activities interventions. Rationale: decrease pain, increase ROM and increase tissue extensibility to improve reaching ability for household chores.        With   [] TE   [] TA   [] neuro   [] other: Patient Education: [x] Review HEP    [] Progressed/Changed HEP based on:   [] positioning   [] body mechanics   [] transfers   [] heat/ice application    [] other:      Other Objective/Functional Measures:  Left shoulder flexion 89 degrees  Left shoulder abduction 48 degrees    Pain Level (0-10 scale) post treatment: 2    ASSESSMENT/Changes in Function: Patient with increase in pain with all activity this date. Regression in AROM and functionality noted though likely due to ~4 week hiatus due to family emergency. Patient compliant with HEP and therapeutic massage in interim. She continues to have difficulty with sleeping at night and all ADLs due to her pain, impaired AROM, and strength. Patient would benefit from continued physical therapy to recover mobility she had previously gained and improve overall left shoulder function for independence and ease of daily tasks. Patient will continue to benefit from skilled PT services to modify and progress therapeutic interventions, address functional mobility deficits, address ROM deficits, address strength deficits, analyze and address soft tissue restrictions, analyze and cue movement patterns and analyze and modify body mechanics/ergonomics to attain remaining goals. []  See Plan of Care  [x]  See progress note/recertification  []  See Discharge Summary         Progress towards goals / Updated goals:  1. Improve FOTO to 74 for improved ability for daily tasks               PN- 58               Current: 7/21/2021 - FOTO 40               2. Improve left shoulder AROM flexion to 155 degrees to improve ability for daily tasks               PN- 150 degrees              Current: 7/21/2021-  Regression - 89 degrees                3. Improve left shoulder AROM ABD to 140 degrees for improved ability for ADL               PN- 133 degrees              Current:7/21/2021 -  Regression - 48 degrees                4.  The pt will report no difficulty with reaching a overhead shelf               PN- some difficulty                XKLWFHA: 7/21/2021 - some difficulty     PLAN  []  Upgrade activities as tolerated     []  Continue plan of care  []  Update interventions per flow sheet       []  Discharge due to:_  []  Other:_      Miriam Wells, PT 7/21/2021  8:34 AM    Future Appointments   Date Time Provider Alondra Mayo   7/27/2021  1:45 PM Estrellita Leal PTA [facility]   7/30/2021 12:45 PM [facility] Mortimer[facility]

## 2021-07-21 NOTE — PROGRESS NOTES
[facility] [address]  [place], [address]  [phone] fax    Physical Therapy Progress Note  Patient name: Soni Sapp Start of Care: 3/18/2021   Referral source: Alfredo Agee,* : 1970                Medical Diagnosis: Left shoulder pain [M25.512]  Payor: [place] / Plan: [facility] / Product Type: PPO /  Onset Date:2021                Treatment Diagnosis: left shoulder pain   Prior Hospitalization: see medical history Provider#: [id number]   Medications: Verified on Patient summary List    Comorbidities: diabetes, right frozen shoulder, brain surgery   Prior Level of Function: functionally I with all activities    Visits from Start of Care: 13    Missed Visits: 3      Established Goals:        Excellent         Good         Limited            None  [] Increased ROM   []  []  []  [x]  [] Increased Strength  []  []  []  [x]  [] Increased Mobility  []  []  []  []   [] Decreased Pain   []  []  []  [x]  [] Decreased Swelling  []  []  []  []    Key Functional Changes: Regression in AROM, strength, and pain levels     1. Improve FOTO to 74 for improved ability for daily tasks               Current:  FOTO 40               2. Improve left shoulder AROM flexion to 155 degrees to improve ability for daily task              Current: Regression - 89 degrees                3. Improve left shoulder AROM ABD to 140 degrees for improved ability for ADL              Current: Regression - 48 degrees                4. The pt will report no difficulty with reaching a overhead shellf              Current: some difficulty     Updated Goals: to be achieved in 4 weeks: All goals as above    ASSESSMENT/RECOMMENDATIONS: Patient with increase in pain with all activity this date. Regression in AROM and functionality noted though likely due to ~4 week hiatus due to family emergency.  Patient compliant with HEP and therapeutic massage in interim. She continues to have difficulty with sleeping at night and all ADLs due to her pain, impaired AROM, and strength. Patient would benefit from continued physical therapy to recover mobility she had previously gained and improve overall left shoulder function for independence and ease of daily tasks. [x]Continue therapy per initial plan/protocol at a frequency of  2 x per week for 4 weeks  []Continue therapy with the following recommended changes:_____________________      _____________________________________________________________________  []Discontinue therapy progressing towards or have reached established goals  []Discontinue therapy due to lack of appreciable progress towards goals  []Discontinue therapy due to lack of attendance or compliance  []Await Physician's recommendations/decisions regarding therapy  []Other:________________________________________________________________    Thank you for this referral.    Salome Easton, PT 7/21/2021 9:24 AM  NOTE TO PHYSICIAN:  [address]   FAX TO [facility]: [phone]  If you are unable to process this request in 24 hours please contact our office: [phone]    ? I have read the above report and request that my patient continue as recommended. ? I have read the above report and request that my patient continue therapy with the following changes/special instructions:__________________________________________________________  ? I have read the above report and request that my patient be discharged from therapy.     Physicians signature: ______________________________Date: ______Time:______     Padilla Bowen

## 2021-07-27 ENCOUNTER — HOSPITAL ENCOUNTER (OUTPATIENT)
Dept: PHYSICAL THERAPY | Age: 51
Discharge: HOME | End: 2021-07-27
Payer: COMMERCIAL

## 2021-07-27 PROCEDURE — 97110 THERAPEUTIC EXERCISES: CPT

## 2021-07-27 PROCEDURE — 97140 MANUAL THERAPY 1/> REGIONS: CPT

## 2021-07-27 NOTE — PROGRESS NOTES
PT DAILY TREATMENT NOTE     Patient Name: Kim Crawford  Date:2021  : 1970  [x]  Patient  Verified  Payor: MARTIN GUNJAN / Plan: Carmen Treadwell / Product Type: PPO /    In time:1:45  Out time:2:35  Total Treatment Time (min): 50  Visit #: 1 of 8    Medicare/BCBS Only   Total Timed Codes (min):  40 1:1 Treatment Time:  40       Treatment Area: Left shoulder pain [M25.512]    SUBJECTIVE  Pain Level (0-10 scale): 4  Any medication changes, allergies to medications, adverse drug reactions, diagnosis change, or new procedure performed?: [x] No    [] Yes (see summary sheet for update)  Subjective functional status/changes:   [] No changes reported  Pt reports being unable to reach out to the side now.     OBJECTIVE    Modality rationale: decrease pain to improve the patients ability to perform ADLs   Min Type Additional Details    [] Estim:  []Unatt       []IFC  []Premod                        []Other:  []w/ice   []w/heat  Position:  Location:    [] Estim: []Att    []TENS instruct  []NMES                    []Other:  []w/US   []w/ice   []w/heat  Position:  Location:    []  Traction: [] Cervical       []Lumbar                       [] Prone          []Supine                       []Intermittent   []Continuous Lbs:  [] before manual  [] after manual    []  Ultrasound: []Continuous   [] Pulsed                           []1MHz   []3MHz W/cm2:  Location:    []  Iontophoresis with dexamethasone         Location: [] Take home patch   [] In clinic    []  Ice     []  heat  []  Ice massage  []  Laser   []  Anodyne Position:  Location:    []  Laser with stim  []  Other:  Position:  Location:   10 (NC) [x]  Vasopneumatic Device    []  Right     [x]  Left  Pre-treatment girth:  Post-treatment girth:  Measured at (location):  Pressure:       [x] lo [] med [] hi   Temperature: [x] lo [] med [] hi   [] Skin assessment post-treatment:  []intact []redness- no adverse reaction    []redness  adverse reaction: 25 min Therapeutic Exercise:  [x] See flow sheet :   Rationale: increase ROM and increase strength to improve the patients ability to perform ADLs    15 min Manual Therapy:  scap mobs, DTM to infra and UT, post/inf GH jt mobs, GH distraction, PROM to left shoulder   The manual therapy interventions were performed at a separate and distinct time from the therapeutic activities interventions. Rationale: decrease pain, increase ROM and increase tissue extensibility to improve functional mobility        With   [] TE   [] TA   [] neuro   [] other: Patient Education: [x] Review HEP    [] Progressed/Changed HEP based on:   [] positioning   [] body mechanics   [] transfers   [] heat/ice application    [] other:      Other Objective/Functional Measures:      Pain Level (0-10 scale) post treatment: 2    ASSESSMENT/Changes in Function: Treatment limited d/t shoulder pain. Pt states MD wants shoulder ROM to improve before doing RTC repair, pt is hoping to wait until next year d/t work schedule. TTP @ infra and UT. Pt has a home pulley system that she has been using. Patient will continue to benefit from skilled PT services to modify and progress therapeutic interventions, address functional mobility deficits, address ROM deficits, address strength deficits, analyze and address soft tissue restrictions, analyze and cue movement patterns, analyze and modify body mechanics/ergonomics and assess and modify postural abnormalities to attain remaining goals. []  See Plan of Care  []  See progress note/recertification  []  See Discharge Summary         Progress towards goals / Updated goals:       1. Improve FOTO to 74 for improved ability for daily tasks              PN:  FOTO 40               2.  Improve left shoulder AROM flexion to 155 degrees to improve ability for daily task  PN: Regression - 89 degrees                3. Improve left shoulder AROM ABD to 140 degrees for improved ability for ADL  PN: Regression - 48 degrees                4.  The pt will report no difficulty with reaching a overhead shellf  PN: some difficulty     PLAN  []  Upgrade activities as tolerated     [x]  Continue plan of care  []  Update interventions per flow sheet       []  Discharge due to:_  []  Other:_      Jina Culpshivani Flowers, PTA 7/27/2021  1:52 PM    Future Appointments   Date Time Provider Alondra Maria Etsher   7/30/2021 12:45 PM Juma Naas, PTA [facility]   8/3/2021 10:30 AM Juma Naas, PTA [facility]   8/5/2021  9:15 AM Juma Naas, PTA [facility]   8/10/2021  7:45 AM Brandon Russ, PT [facility]   8/12/2021  8:30 AM Brandon Russ, PT [facility]   8/17/2021  9:00 AM Lucía Balyannick, PT [facility]   8/19/2021  8:30 AM Brandon Russ, PT [facility]   8/24/2021  7:00 AM Brandon Russ, PT [facility]   8/26/2021  7:00 AM Brandon Russ, PT [facility]

## 2021-07-30 ENCOUNTER — HOSPITAL ENCOUNTER (OUTPATIENT)
Dept: PHYSICAL THERAPY | Age: 51
Discharge: HOME | End: 2021-07-30
Payer: COMMERCIAL

## 2021-07-30 PROCEDURE — 97110 THERAPEUTIC EXERCISES: CPT

## 2021-07-30 PROCEDURE — 97140 MANUAL THERAPY 1/> REGIONS: CPT

## 2021-07-30 NOTE — PROGRESS NOTES
PT DAILY TREATMENT NOTE     Patient Name: Luci Sanchez  Date:2021  : 1970  [x]  Patient  Verified  Payor: BLUE CROSS / Plan: [address] [place] [address] [place] / Product Type: PPO /    In time:12:47  Out time:1:42  Total Treatment Time (min): 55  Visit #: 2 of 8    Medicare/BCBS Only   Total Timed Codes (min):  45 1:1 Treatment Time:  40       Treatment Area: Left shoulder pain [M25.512]    SUBJECTIVE  Pain Level (0-10 scale): 1-2  Any medication changes, allergies to medications, adverse drug reactions, diagnosis change, or new procedure performed?: [x] No    [] Yes (see summary sheet for update)  Subjective functional status/changes:   [] No changes reported  Pt reports she was unsure if she had a tear in her left shoulder and was unable to completely understand the notes she read over in \"my chart\". Pt states she intends to seek a second opinion on having RTC surgery. OBJECTIVE    Modality rationale: decrease edema, decrease inflammation and decrease pain to improve the patients ability to perform daily task with ease.    Min Type Additional Details    [] Estim:  []Unatt       []IFC  []Premod                        []Other:  []w/ice   []w/heat  Position:  Location:    [] Estim: []Att    []TENS instruct  []NMES                    []Other:  []w/US   []w/ice   []w/heat  Position:  Location:    []  Traction: [] Cervical       []Lumbar                       [] Prone          []Supine                       []Intermittent   []Continuous Lbs:  [] before manual  [] after manual    []  Ultrasound: []Continuous   [] Pulsed                           []1MHz   []3MHz W/cm2:  Location:    []  Iontophoresis with dexamethasone         Location: [] Take home patch   [] In clinic    []  Ice     []  heat  []  Ice massage  []  Laser   []  Anodyne Position:  Location:    []  Laser with stim  []  Other:  Position:  Location:   10 [x]  Vasopneumatic Device    [x]  Right     []  Left  Pre-treatment girth:  Post-treatment girth: Measured at (location):  Pressure:       [x] lo [] med [] hi   Temperature: [] lo [] med [] hi   [x] Skin assessment post-treatment:  [x]intact []redness- no adverse reaction    []redness  adverse reaction:         35 min Therapeutic Exercise:  [x] See flow sheet :   Rationale: increase ROM and increase strength to improve the patients ability to perform functional task with ease. 10 min Manual Therapy:   scap mobs, DTM to infra and UT, post/inf GH jt mobs, GH distraction, PROM to left shoulder   The manual therapy interventions were performed at a separate and distinct time from the therapeutic activities interventions. Rationale: decrease pain, increase ROM, increase tissue extensibility and decrease trigger points to improve ease of ADL's. With   [] TE   [] TA   [] neuro   [] other: Patient Education: [x] Review HEP    [] Progressed/Changed HEP based on:   [] positioning   [] body mechanics   [] transfers   [] heat/ice application    [] other:      Other Objective/Functional Measures:      Pain Level (0-10 scale) post treatment: 3    ASSESSMENT/Changes in Function:  Pt continues to report increased pain in her left shoulder and continues to display a decreasing tolerance to therex. No significant changes to therex with some exercises continuing to be held due to pain. Fair tolerance to manual this visit with pt mildly guarded. Mild increase in left shoulder discomfort however pt left in no apparent distress. Patient will continue to benefit from skilled PT services to modify and progress therapeutic interventions, address functional mobility deficits, address ROM deficits, address strength deficits, analyze and address soft tissue restrictions, analyze and cue movement patterns, analyze and modify body mechanics/ergonomics and assess and modify postural abnormalities to attain remaining goals.      [x]  See Plan of Care  []  See progress note/recertification  []  See Discharge Summary Progress towards goals / Updated goals:    1. Improve FOTO to 74 for improved ability for daily tasks              PN:  FOTO 40               2. Improve left shoulder AROM flexion to 155 degrees to improve ability for daily task  PN: Regression - 89 degrees                3. Improve left shoulder AROM ABD to 140 degrees for improved ability for ADL  PN: Regression - 48 degrees                4.  The pt will report no difficulty with reaching a overhead shellf  PN: some difficulty        PLAN  []  Upgrade activities as tolerated     [x]  Continue plan of care  []  Update interventions per flow sheet       []  Discharge due to:_  []  Other:_      Jak Anderson, PTA 7/30/2021  12:49 PM    Future Appointments   Date Time Provider Alondra Mayo   8/3/2021 10:30 AM Catrachita Blanche, PTA [facility]   8/5/2021  9:15 AM Catrachita Blanche, PTA [facility]   8/10/2021  7:45 AM Irina Beech, PT [facility]   8/12/2021  8:30 AM Irina Beech, PT [facility]   8/17/2021  9:00 AM Mónica Cordova, PT [facility]   8/19/2021  8:30 AM Irina Beech, PT [facility]   8/24/2021  7:00 AM Irina Beech, PT [facility]   8/26/2021  7:00 AM Irina Beech, PT [facility]

## 2021-08-03 ENCOUNTER — HOSPITAL ENCOUNTER (OUTPATIENT)
Dept: PHYSICAL THERAPY | Age: 51
Discharge: HOME | End: 2021-08-03
Payer: COMMERCIAL

## 2021-08-03 PROCEDURE — 97140 MANUAL THERAPY 1/> REGIONS: CPT

## 2021-08-03 PROCEDURE — 97110 THERAPEUTIC EXERCISES: CPT

## 2021-08-03 NOTE — PROGRESS NOTES
PT DAILY TREATMENT NOTE     Patient Name: Conrado Chapin  Date:8/3/2021  : 1970  [x]  Patient  Verified  Payor: BLUE CROSS / Plan: [facility] / Product Type: PPO /    In time:10:32  Out time:11:28  Total Treatment Time (min): 56  Visit #: 3 of 8    Medicare/BCBS Only   Total Timed Codes (min):  46 1:1 Treatment Time:  42       Treatment Area: Left shoulder pain [M25.512]    SUBJECTIVE  Pain Level (0-10 scale): 3  Any medication changes, allergies to medications, adverse drug reactions, diagnosis change, or new procedure performed?: [x] No    [] Yes (see summary sheet for update)  Subjective functional status/changes:   [] No changes reported  \"It's talking to me this morning\"    OBJECTIVE    Modality rationale: decrease edema, decrease inflammation and decrease pain to improve the patients ability to perform daily task with ease.    Min Type Additional Details    [] Estim:  []Unatt       []IFC  []Premod                        []Other:  []w/ice   []w/heat  Position:  Location:    [] Estim: []Att    []TENS instruct  []NMES                    []Other:  []w/US   []w/ice   []w/heat  Position:  Location:    []  Traction: [] Cervical       []Lumbar                       [] Prone          []Supine                       []Intermittent   []Continuous Lbs:  [] before manual  [] after manual    []  Ultrasound: []Continuous   [] Pulsed                           []1MHz   []3MHz W/cm2:  Location:    []  Iontophoresis with dexamethasone         Location: [] Take home patch   [] In clinic    []  Ice     []  heat  []  Ice massage  []  Laser   []  Anodyne Position:  Location:    []  Laser with stim  []  Other:  Position:  Location:   10 [x]  Vasopneumatic Device    [x]  Right     []  Left  Pre-treatment girth:  Post-treatment girth:  Measured at (location):  Pressure:       [x] lo [] med [] hi   Temperature: [] lo [] med [] hi   [] Skin assessment post-treatment:  []intact []redness- no adverse reaction []redness  adverse reaction:         36 min Therapeutic Exercise:  [x] See flow sheet :   Rationale: increase ROM and increase strength to improve the patients ability to perform functional task with ease. 10 min Manual Therapy:  scap mobs, DTM to infra and UT, post/inf GH jt mobs, GH distraction, PROM to left shoulder   The manual therapy interventions were performed at a separate and distinct time from the therapeutic activities interventions. Rationale: decrease pain, increase ROM, increase tissue extensibility and decrease trigger points to improve functional mobility with overhead reaching ADL's. With   [] TE   [] TA   [] neuro   [] other: Patient Education: [x] Review HEP    [] Progressed/Changed HEP based on:   [] positioning   [] body mechanics   [] transfers   [] heat/ice application    [] other:      Other Objective/Functional Measures:   C/S rotation- 10x ea  UT stretch 30\" ea     Pain Level (0-10 scale) post treatment: 1-2    ASSESSMENT/Changes in Function:   Therex modified with some regressions due to pt reporting increasing pain in the left shoulder and C/S and displaying increasing limitations with left shoulder mobility throughout therex. Pt reports tightness and pain in the left c/s with most of therex, UT stretch and C/S rotations added to aid with decreased tightness and discomfort in the c/s. Pt reports she goes for her second opinion about RTC surgery tomorrow 8/4/21. Decreased pain reported post treatment. Patient will continue to benefit from skilled PT services to modify and progress therapeutic interventions, address functional mobility deficits, address ROM deficits, address strength deficits, analyze and address soft tissue restrictions, analyze and cue movement patterns, analyze and modify body mechanics/ergonomics and assess and modify postural abnormalities to attain remaining goals.      [x]  See Plan of Care  []  See progress note/recertification  []  See Discharge Summary         Progress towards goals / Updated goals:  1. Improve FOTO to 74 for improved ability for daily tasks  PN:  FOTO 40               2. Improve left shoulder AROM flexion to 155 degrees to improve ability for daily task  PN: Regression - 89 degrees                3. Improve left shoulder AROM ABD to 140 degrees for improved ability for ADL  PN: Regression - 48 degrees                4.  The pt will report no difficulty with reaching a overhead shellf  PN: some difficulty   Current: regressing 8/3/21 moderate difficulty    PLAN  []  Upgrade activities as tolerated     [x]  Continue plan of care  []  Update interventions per flow sheet       []  Discharge due to:_  []  Other:_      Shireen Kentrell, PTA 8/3/2021  10:34 AM    Future Appointments   Date Time Provider Alondra Mayo   8/5/2021  9:15 AM Stef Quiroz, PTA [facility]   8/10/2021  7:45 AM Arland Busing, PT [facility]   8/12/2021  8:30 AM Arland Busing, PT [facility]   8/17/2021  9:00 AM Shelli Duke, PT [facility]   8/19/2021  8:30 AM Arland Busing, PT [facility]   8/24/2021  7:00 AM Arland Busing, PT [facility]   8/26/2021  7:00 AM Chunyu Busing, PT [facility]

## 2021-08-05 ENCOUNTER — APPOINTMENT (OUTPATIENT)
Dept: PHYSICAL THERAPY | Age: 51
End: 2021-08-05
Payer: COMMERCIAL

## 2021-08-10 ENCOUNTER — APPOINTMENT (OUTPATIENT)
Dept: PHYSICAL THERAPY | Age: 51
End: 2021-08-10
Payer: COMMERCIAL

## 2021-08-12 ENCOUNTER — APPOINTMENT (OUTPATIENT)
Dept: PHYSICAL THERAPY | Age: 51
End: 2021-08-12
Payer: COMMERCIAL

## 2021-08-17 ENCOUNTER — APPOINTMENT (OUTPATIENT)
Dept: PHYSICAL THERAPY | Age: 51
End: 2021-08-17
Payer: COMMERCIAL

## 2021-08-19 ENCOUNTER — APPOINTMENT (OUTPATIENT)
Dept: PHYSICAL THERAPY | Age: 51
End: 2021-08-19
Payer: COMMERCIAL

## 2021-08-24 ENCOUNTER — APPOINTMENT (OUTPATIENT)
Dept: PHYSICAL THERAPY | Age: 51
End: 2021-08-24
Payer: COMMERCIAL

## 2021-08-26 ENCOUNTER — APPOINTMENT (OUTPATIENT)
Dept: PHYSICAL THERAPY | Age: 51
End: 2021-08-26
Payer: COMMERCIAL

## 2021-11-30 NOTE — PROGRESS NOTES
[facility]  [address]  [place], [address]  [phone] fax    Physical Therapy Discharge Summary  Patient name: Olive Peck Start of Care: 3/18/2021   Referral Jorge Michele,* DOY: [birth year]                Medical Diagnosis: Left shoulder pain [M25.512]  Payor: BLUE CROSS / Plan: [facility] / Product Type: PPO /  Onset Date:Feb 2021                Treatment Diagnosis: left shoulder pain   Prior Hospitalization: see medical history Provider#: [id number]   Medications: Verified on Patient summary List    Comorbidities: diabetes, right frozen shoulder, brain surgery   Prior Level of Function: functionally I with all activities   Visits from Start of Care: 16    Missed Visits: 2  Reporting Period : 7/21/2021 to 8/03/2021      Summary of Care:  1. Improve FOTO to 74 for improved ability for daily tasks  PN:  FOTO 40               2. Improve left shoulder AROM flexion to 155 degrees to improve ability for daily task  PN: Regression - 89 degrees                3. Improve left shoulder AROM ABD to 140 degrees for improved ability for ADL  PN: Regression - 48 degrees                4. The pt will report no difficulty with reaching a overhead shellf  PN: some difficulty   Current: regressing 8/3/21 moderate difficulty       ASSESSMENT/RECOMMENDATIONS: The patient regressed with her motion per goals concerning AROM into both flexion/ABD. She opted to self D/C and further goal assessment was unable to be assessed.      [x]Discontinue therapy: []Patient has reached or is progressing toward set goals      []Patient is non-compliant or has abdicated      [x]Due to lack of appreciable progress towards set [address], PT 11/30/2021 2:35 PM

## (undated) DEVICE — STOCKINETTE,IMPERVIOUS,12X48,STERILE: Brand: MEDLINE

## (undated) DEVICE — TOURNIQUET PHLEB L EXSANGUINATING FOR AD DGT TOURNI-COT

## (undated) DEVICE — BLADE OPHTH MINI BEAV SHRP --

## (undated) DEVICE — PACK PROCEDURE SURG MAJ W/ BASIN LF

## (undated) DEVICE — BNDG ELAS HK LOOP 4X5YD NS -- MATRIX

## (undated) DEVICE — SYR LR LCK 1ML GRAD NSAF 30ML --

## (undated) DEVICE — SUTURE CHROMIC GUT SZ 5-0 L18IN ABSRB BRN L16MM PS-3 3/8 1636G

## (undated) DEVICE — BANDAGE,GAUZE,CONFORMING,1"X75",STRL,LF: Brand: MEDLINE

## (undated) DEVICE — KIT CLN UP BON SECOURS MARYV

## (undated) DEVICE — BIPOLAR FORCEPS CORD: Brand: VALLEYLAB

## (undated) DEVICE — INTENDED FOR TISSUE SEPARATION, AND OTHER PROCEDURES THAT REQUIRE A SHARP SURGICAL BLADE TO PUNCTURE OR CUT.: Brand: BARD-PARKER SAFETY BLADES SIZE 10, STERILE

## (undated) DEVICE — BANDAGE,ELASTIC,ESMARK,STERILE,4"X9',LF: Brand: MEDLINE

## (undated) DEVICE — THREE-QUARTER SHEET: Brand: CONVERTORS

## (undated) DEVICE — SHEET,DRAPE,70X100,STERILE: Brand: MEDLINE

## (undated) DEVICE — SOLUTION IV 1000ML 0.9% SOD CHL

## (undated) DEVICE — CANNULA PERF L2IN BLNT TIP 2MM VES CLR RADPQ BODY FEM LUER

## (undated) DEVICE — STERILE POLYISOPRENE POWDER-FREE SURGICAL GLOVES: Brand: PROTEXIS

## (undated) DEVICE — PAD,NON-ADHERENT,3X8,STERILE,LF,1/PK: Brand: MEDLINE

## (undated) DEVICE — GAUZE,SPONGE,4"X4",16PLY,STRL,LF,10/TRAY: Brand: MEDLINE

## (undated) DEVICE — PADDING CAST W4INXL4YD ST COT COHESIVE HND TEARABLE SPEC

## (undated) DEVICE — GOWN,REINFORCE,POLY,SIRUS,SETSLV,XLARGE: Brand: MEDLINE

## (undated) DEVICE — DRESSING,GAUZE,XEROFORM,CURAD,1"X8",ST: Brand: CURAD

## (undated) DEVICE — SYR 10ML LUER LOK 1/5ML GRAD --

## (undated) DEVICE — PREP SKN CHLRAPRP APL 26ML STR --

## (undated) DEVICE — GARMENT,MEDLINE,DVT,INT,THIGH,M, GEN2: Brand: MEDLINE

## (undated) DEVICE — BNDG CMPR ELAS KNT VEL STD 3IN -- MEDICHOICE

## (undated) DEVICE — DERMACEA GAUZE ROLL: Brand: DERMACEA

## (undated) DEVICE — ZIMMER® STERILE DISPOSABLE TOURNIQUET CUFF WITH PROTECTIVE SLEEVE AND PLC, DUAL PORT, SINGLE BLADDER, 18 IN. (46 CM)

## (undated) DEVICE — BLANKET WRM AD W50XL85.8IN PACU FULL BODY FORC AIR

## (undated) DEVICE — BANDAGE COBAN 4 IN COMPR W4INXL5YD FOAM COHESIVE QUIK STK SELF ADH SFT